# Patient Record
Sex: MALE | Race: WHITE | NOT HISPANIC OR LATINO | Employment: OTHER | URBAN - METROPOLITAN AREA
[De-identification: names, ages, dates, MRNs, and addresses within clinical notes are randomized per-mention and may not be internally consistent; named-entity substitution may affect disease eponyms.]

---

## 2017-03-21 ENCOUNTER — RECORDS - HEALTHEAST (OUTPATIENT)
Dept: LAB | Facility: CLINIC | Age: 72
End: 2017-03-21

## 2017-03-21 LAB
CHOLEST SERPL-MCNC: 208 MG/DL
FASTING STATUS PATIENT QL REPORTED: YES
HDLC SERPL-MCNC: 51 MG/DL
LDLC SERPL CALC-MCNC: 133 MG/DL
TRIGL SERPL-MCNC: 120 MG/DL

## 2017-03-23 ENCOUNTER — COMMUNICATION - HEALTHEAST (OUTPATIENT)
Dept: CARDIOLOGY | Facility: CLINIC | Age: 72
End: 2017-03-23

## 2017-03-27 ENCOUNTER — AMBULATORY - HEALTHEAST (OUTPATIENT)
Dept: CARDIOLOGY | Facility: CLINIC | Age: 72
End: 2017-03-27

## 2017-03-28 ENCOUNTER — AMBULATORY - HEALTHEAST (OUTPATIENT)
Dept: CARDIOLOGY | Facility: CLINIC | Age: 72
End: 2017-03-28

## 2017-03-29 ENCOUNTER — OFFICE VISIT - HEALTHEAST (OUTPATIENT)
Dept: CARDIOLOGY | Facility: CLINIC | Age: 72
End: 2017-03-29

## 2017-03-29 ENCOUNTER — COMMUNICATION - HEALTHEAST (OUTPATIENT)
Dept: TELEHEALTH | Facility: CLINIC | Age: 72
End: 2017-03-29

## 2017-03-29 ENCOUNTER — AMBULATORY - HEALTHEAST (OUTPATIENT)
Dept: CARDIOLOGY | Facility: CLINIC | Age: 72
End: 2017-03-29

## 2017-03-29 DIAGNOSIS — I25.10 NONOCCLUSIVE CORONARY ATHEROSCLEROSIS OF NATIVE CORONARY ARTERY: ICD-10-CM

## 2017-03-29 DIAGNOSIS — E78.5 DYSLIPIDEMIA, GOAL LDL BELOW 70: ICD-10-CM

## 2017-03-29 DIAGNOSIS — R07.9 CHEST PAIN, UNSPECIFIED TYPE: ICD-10-CM

## 2017-03-29 DIAGNOSIS — I48.0 PAROXYSMAL ATRIAL FIBRILLATION (H): ICD-10-CM

## 2017-03-29 RX ORDER — DIGOXIN 250 MCG
250 TABLET ORAL DAILY
Status: SHIPPED | COMMUNITY
Start: 2017-03-29 | End: 2024-04-24

## 2017-03-29 ASSESSMENT — MIFFLIN-ST. JEOR: SCORE: 1545.46

## 2017-04-10 ENCOUNTER — HOSPITAL ENCOUNTER (OUTPATIENT)
Dept: CARDIOLOGY | Facility: HOSPITAL | Age: 72
Discharge: HOME | End: 2017-04-10
Attending: INTERNAL MEDICINE

## 2017-04-10 LAB
CV STRESS MAX HR HE: 134
STRESS ECHO BASELINE BP: NORMAL M/S
STRESS ECHO BASELINE HR: 57 BPM
STRESS ECHO CALCULATED PERCENT HR: 91 %
STRESS ECHO LAST STRESS BP: NORMAL M/S
STRESS ECHO POST ESTIMATED WORKLOAD: 12.1 METS
STRESS ECHO POST EXERCISE DUR MIN: 10 MIN
STRESS ECHO POST EXERCISE DUR SEC: 23 SEC
STRESS ECHO TARGET HR: 125

## 2017-05-02 ENCOUNTER — COMMUNICATION - HEALTHEAST (OUTPATIENT)
Dept: TELEHEALTH | Facility: CLINIC | Age: 72
End: 2017-05-02

## 2017-05-02 ENCOUNTER — AMBULATORY - HEALTHEAST (OUTPATIENT)
Dept: CARDIOLOGY | Facility: CLINIC | Age: 72
End: 2017-05-02

## 2017-05-02 ENCOUNTER — OFFICE VISIT - HEALTHEAST (OUTPATIENT)
Dept: CARDIOLOGY | Facility: CLINIC | Age: 72
End: 2017-05-02

## 2017-05-02 DIAGNOSIS — I25.10 NONOCCLUSIVE CORONARY ATHEROSCLEROSIS OF NATIVE CORONARY ARTERY: ICD-10-CM

## 2017-05-02 DIAGNOSIS — I48.0 PAROXYSMAL ATRIAL FIBRILLATION (H): ICD-10-CM

## 2017-05-02 DIAGNOSIS — E78.5 DYSLIPIDEMIA, GOAL LDL BELOW 70: ICD-10-CM

## 2017-05-02 RX ORDER — ATORVASTATIN CALCIUM 10 MG/1
TABLET, FILM COATED ORAL
Status: SHIPPED | COMMUNITY
Start: 2017-04-21 | End: 2021-09-21

## 2017-05-02 ASSESSMENT — MIFFLIN-ST. JEOR: SCORE: 1572.67

## 2017-05-04 ENCOUNTER — COMMUNICATION - HEALTHEAST (OUTPATIENT)
Dept: CARDIOLOGY | Facility: CLINIC | Age: 72
End: 2017-05-04

## 2017-05-04 DIAGNOSIS — E78.5 HYPERLIPIDEMIA: ICD-10-CM

## 2017-05-30 ENCOUNTER — AMBULATORY - HEALTHEAST (OUTPATIENT)
Dept: CARDIOLOGY | Facility: CLINIC | Age: 72
End: 2017-05-30

## 2017-11-30 ENCOUNTER — RECORDS - HEALTHEAST (OUTPATIENT)
Dept: LAB | Facility: CLINIC | Age: 72
End: 2017-11-30

## 2017-11-30 LAB
CHOLEST SERPL-MCNC: 146 MG/DL
FASTING STATUS PATIENT QL REPORTED: NORMAL
HDLC SERPL-MCNC: 52 MG/DL
LDLC SERPL CALC-MCNC: 76 MG/DL
TRIGL SERPL-MCNC: 90 MG/DL

## 2018-11-15 ENCOUNTER — RECORDS - HEALTHEAST (OUTPATIENT)
Dept: ADMINISTRATIVE | Facility: OTHER | Age: 73
End: 2018-11-15

## 2018-11-15 ENCOUNTER — AMBULATORY - HEALTHEAST (OUTPATIENT)
Dept: CARDIOLOGY | Facility: CLINIC | Age: 73
End: 2018-11-15

## 2019-06-11 ENCOUNTER — RECORDS - HEALTHEAST (OUTPATIENT)
Dept: LAB | Facility: CLINIC | Age: 74
End: 2019-06-11

## 2019-06-11 LAB
CHOLEST SERPL-MCNC: 141 MG/DL
DIGOXIN LEVEL LHE- HISTORICAL: 0.7 NG/ML (ref 0.5–2)
FASTING STATUS PATIENT QL REPORTED: NORMAL
FASTING STATUS PATIENT QL REPORTED: NORMAL
GLUCOSE BLD-MCNC: 89 MG/DL (ref 70–125)
HDLC SERPL-MCNC: 50 MG/DL
LDLC SERPL CALC-MCNC: 79 MG/DL
PSA SERPL-MCNC: 3.9 NG/ML (ref 0–6.5)
TRIGL SERPL-MCNC: 62 MG/DL

## 2019-06-12 ENCOUNTER — AMBULATORY - HEALTHEAST (OUTPATIENT)
Dept: CARDIOLOGY | Facility: CLINIC | Age: 74
End: 2019-06-12

## 2019-06-21 ENCOUNTER — COMMUNICATION - HEALTHEAST (OUTPATIENT)
Dept: CARDIOLOGY | Facility: CLINIC | Age: 74
End: 2019-06-21

## 2019-12-17 ENCOUNTER — RECORDS - HEALTHEAST (OUTPATIENT)
Dept: LAB | Facility: CLINIC | Age: 74
End: 2019-12-17

## 2019-12-17 LAB
CHOLEST SERPL-MCNC: 152 MG/DL
DIGOXIN LEVEL LHE- HISTORICAL: 0.5 NG/ML (ref 0.5–2)
FASTING STATUS PATIENT QL REPORTED: NORMAL
HDLC SERPL-MCNC: 56 MG/DL
LDLC SERPL CALC-MCNC: 80 MG/DL
TRIGL SERPL-MCNC: 80 MG/DL

## 2020-09-11 ENCOUNTER — RECORDS - HEALTHEAST (OUTPATIENT)
Dept: LAB | Facility: CLINIC | Age: 75
End: 2020-09-11

## 2020-09-11 LAB
CHOLEST SERPL-MCNC: 144 MG/DL
DIGOXIN LEVEL LHE- HISTORICAL: 1.6 NG/ML (ref 0.5–2)
FASTING STATUS PATIENT QL REPORTED: NORMAL
HDLC SERPL-MCNC: 58 MG/DL
LDLC SERPL CALC-MCNC: 74 MG/DL
PSA SERPL-MCNC: 3 NG/ML (ref 0–6.5)
TRIGL SERPL-MCNC: 59 MG/DL

## 2021-05-25 ENCOUNTER — RECORDS - HEALTHEAST (OUTPATIENT)
Dept: ADMINISTRATIVE | Facility: CLINIC | Age: 76
End: 2021-05-25

## 2021-05-27 ENCOUNTER — RECORDS - HEALTHEAST (OUTPATIENT)
Dept: ADMINISTRATIVE | Facility: CLINIC | Age: 76
End: 2021-05-27

## 2021-05-29 NOTE — TELEPHONE ENCOUNTER
----- Message from Rashid Castaneda MD sent at 6/21/2019  8:57 AM CDT -----  Lb work sent to me by primary belen seen in 2 years does he wish to follow up? cleopatra ramos  ----- Message -----  From: Taylor Long  Sent: 6/12/2019   8:44 AM  To: Rashid Castaneda MD

## 2021-05-29 NOTE — TELEPHONE ENCOUNTER
"Pt stated labs didn't need to be sent to [facility].  He is now following with Lizette Georges.  Pt did mention that Dr Castaneda is his cardiologist and I offered to get him to scheduling for an appt since he hasn't seen Dr Castaneda in over 2 years, and pt stated \"I don't need to, I haven't been having any problems.\"  -rah  "

## 2021-05-30 VITALS — HEIGHT: 73 IN | WEIGHT: 169 LBS | BODY MASS INDEX: 22.4 KG/M2

## 2021-05-30 VITALS — HEIGHT: 73 IN | WEIGHT: 175 LBS | BODY MASS INDEX: 23.19 KG/M2

## 2021-06-10 NOTE — PROGRESS NOTES
[facility] Progress Note    Assessment:  1.  Nonobstructive coronary artery disease based on coronary CT angiography performed at [facility] after presenting for evaluation of chest discomfort.  He has not had ongoing complaints of chest discomfort.  He recently had an exercise stress echocardiogram April 10, 2017 that demonstrated no echocardiographic evidence for ischemia but did have EKG changes which are reviewed today.  We talked about the pathophysiology of coronary atherosclerosis.  We talked about utilizing aspirin 81 mg daily and LDL goal to be 70.  He is on 10 mg of atorvastatin that was started in March 2017 I have recommended a follow-up lipid and liver panel which she will do up at near his home and then have these results forwarded to me.    2.  Paroxysmal atrial fibrillation.  He has not had any awareness of atrial fibrillation in the past 1 year or greater.  He has a chads vas score of 1 and is chosen to be on aspirin and monitoring his heart rate regularly.  He is available to him propafenone to take as needed at the onset of atrial fibrillation and will continue to monitor his heart rhythm for atrial fibrillation as we discussed today.  He has been on daily digoxin which predates my consultation and he had a digoxin level May 2016 of 0.7 normal renal function March 21, 2017 and normal liver function tests.        Plan: Lipid panel in follow-up in 6 months    1. Dyslipidemia, goal LDL below 70     2. Nonocclusive coronary atherosclerosis of native coronary artery     3. Paroxysmal atrial fibrillation           An After Visit Summary was printed and given to the patient.    Subjective:    Ramesh Pate is a 72 y.o. male who returned for a planned  follow up visit.  He was traveling back from [place] and on the plane home he developed chest discomfort diaphoresis and nausea as outlined in Dr. Burns's recent notes.  He was evaluated in the [facility] and had  a CT angiogram that demonstrated mild nonobstructive coronary artery disease in the coronary territories as described and outlined in the chart record.  He has not had any complaints of exertional chest discomfort has been more regular with respect to exercise.  He described a mild parasternal focal chest discomfort when he saw Dr. Burns but is not reporting that this chest discomfort is present currently or more recently.  He has not been aware of any atrial fibrillation and monitors his pulse closely and has not utilized any propafenone or metoprolol.    Review of Systems:   General: WNL  Eyes: WNL  Ears/Nose/Throat: WNL  Lungs: WNL  Heart: Chest Pain  Stomach: WNL  Bladder: Frequent Urination at Night  Muscle/Joints: WNL  Skin: WNL  Nervous System: WNL  Mental Health: WNL     Blood: WNL      Problem List:    Patient Active Problem List   Diagnosis     Dyslipidemia, goal LDL below 70     Esophageal reflux     Paroxysmal Atrial Fibrillation     Nonocclusive coronary atherosclerosis of native coronary artery       Social History     Social History     Marital status:      Spouse name: Marilynn     Number of children: 3     Years of education: N/A     Occupational History      Retired     [employer], they made high precision machining     Social History Main Topics     Smoking status: Never Smoker     Smokeless tobacco: Never Used     Alcohol use No     Drug use: No     Sexual activity: Yes     Partners: Female     Birth control/ protection: Post-menopausal     Other Topics Concern     Not on file     Social History Narrative    Bought a condo on the [address] [facility] and was remodeling it during January and February to rent it. He enjoys walking, fishing, house projects and volunteers at the [employer] and is the  for his [employer].       Family History   Problem Relation Age of Onset     Liver cancer Mother      Lung cancer Father      No Medical Problems Sister      Parkinsonism  "Sister      No Medical Problems Daughter      No Medical Problems Daughter      No Medical Problems Daughter        Current Outpatient Prescriptions   Medication Sig Dispense Refill     aspirin 81 MG EC tablet Take 81 mg by mouth daily.       atorvastatin (LIPITOR) 10 MG tablet        digoxin (LANOXIN) 250 mcg tablet Take 250 mcg by mouth daily.       MULTIVITAMIN (MULTIPLE VITAMINS ORAL) Take 1 tablet by mouth daily.       omeprazole (PRILOSEC) 40 MG capsule Take 40 mg by mouth daily.       pramipexole (MIRAPEX) 0.75 MG tablet Take 1/2 tablet daily       propafenone (RYTHMOL) 150 MG tablet Take 1 tablet (150 mg total) by mouth every 6 (six) hours as needed (TAKE 1 TAB AT THE ONSET OF AFIB, MAY REPEAT IN 6 HRS IF AFIB PERSISTS). 6 tablet 3     vardenafil (LEVITRA) 20 MG tablet Take 20 mg by mouth as needed.       VIT C/VIT E/LUTEIN/MIN/OMEGA-3 (OCUVITE ORAL) Take 1 tablet by mouth daily.       No current facility-administered medications for this visit.        Objective:     /68 (Patient Site: Right Arm, Patient Position: Sitting, Cuff Size: Adult Regular)  Pulse 72  Resp 16  Ht 6' 1\" (1.854 m)  Wt 175 lb (79.4 kg)  BMI 23.09 kg/m2  175 lb (79.4 kg)       Physical Exam:    GENERAL APPEARANCE: alert, no apparent distress  HEENT: no scleral icterus or xanthelasma  NECK: jugular venous pressure within normal limits  CHEST: symmetric, the lungs are clear to auscultation  CARDIOVASCULAR: regular rhythm without murmur, click, or gallop; no carotid bruits  Abdomen: No Organomegaly, masses, bruits, or tenderness. Bowels sounds are present      EXTREMITIES: no cyanosis, clubbing or edema    Cardiac Testing:  Indications      Chest pain, unspecified type       Interpretation Summary        Stress echocardiogram is negative for inducible ischemia.    The stress electrocardiogram was abnormal with 3 mm of down slopping ST segment depression in the II, III, aVF, V3, V4, V5 and V6 leads, beginning at 5.41 minutes of " stress, and returning to baseline after 7 minutes into the recovery phase.    The patient's exercise tolerance was normal.    No symptoms were reported by the patient during exercise.       CORONARY ANGIOGRAPHY:     LM: Left main bifurcates into the LAD and LCX.  There is focal calcified and mild noncalcified plaque at the ostium of the left main with less than 20% luminal narrowing.    LAD: The LAD is a moderate sized vessel;  it supplies three diagonal vessel/vessels before going on to the apex.  There is a mild burden of calcified plaque within the proximal and mid LAD with the most prominent lesion located in the proximal LAD resulting in less than 25% narrowing .    LCX:  The LCX is a a moderate sized vessel;  it supplies 3 obtuse marginal/marginals.  There is moderate focal calcified plaque within the mid circumflex but without evidence of a significant stenosis.    RCA: The RCA is a moderate gauge vessel which supplies the PDA and PL branches.  There is a mild burden of calcified and noncalcified plaque within the right coronary artery but with less than 30% luminal narrowing.     LV FUNCTION:   LVEF:  55%; wall motion demonstrates no focal wall motion abnormalities.     OTHER CARDIAC FINDINGS:   Diffuse atherosclerotic plaque along the included aorta.  Normal caliber great vessels.    NON CARDIAC CT FINDINGS (reviewed with Radiology):   There is no mediastinal or hilar adenopathy.  Bibasilar dependent areas of atelectasis.  And additional dynamic opacities may reflect atelectasis and/or scarring.  No pleural effusion.  Central airways are normal.  Included upper abdomen is unremarkable.  Mild thoracic spondylosis        Lab Results:    Lab Results   Component Value Date     03/21/2017    K 4.1 03/21/2017     03/21/2017    CO2 29 03/21/2017    BUN 19 03/21/2017    CREATININE 0.98 03/21/2017    CALCIUM 9.5 03/21/2017     Lab Results   Component Value Date    CHOL 208 (H) 03/21/2017    TRIG 120  03/21/2017    HDL 51 03/21/2017     No results found for: BNP  Creatinine (mg/dL)   Date Value   03/21/2017 0.98   05/25/2016 0.97   03/22/2016 0.94   10/13/2015 0.95     LDL Calculated (mg/dL)   Date Value   03/21/2017 133 (H)   05/25/2016 121   10/13/2015 114     Lab Results   Component Value Date    WBC 5.0 03/22/2016    HGB 13.4 (L) 03/22/2016    HCT 40.8 03/22/2016    MCV 92 03/22/2016     03/22/2016               This note has been dictated using voice recognition software. Any grammatical or context distortions are unintentional and inherent to the software.      Rashid Castaneda M.D., F.A.C.C.  [facility]  [phone]

## 2021-06-15 PROBLEM — I25.10 NONOCCLUSIVE CORONARY ATHEROSCLEROSIS OF NATIVE CORONARY ARTERY: Chronic | Status: ACTIVE | Noted: 2017-03-17

## 2021-06-25 ENCOUNTER — RECORDS - HEALTHEAST (OUTPATIENT)
Dept: LAB | Facility: CLINIC | Age: 76
End: 2021-06-25

## 2021-06-25 LAB
C REACTIVE PROTEIN LHE: 0.2 MG/DL (ref 0–0.8)
ERYTHROCYTE [SEDIMENTATION RATE] IN BLOOD BY WESTERGREN METHOD: 9 MM/HR (ref 0–15)

## 2021-06-25 NOTE — PROGRESS NOTES
"Progress Notes by Ramin Burns MD at 3/29/2017 11:20 AM     Author: Ramin Burns MD Service: -- Author Type: Physician    Filed: 3/29/2017 12:07 PM Encounter Date: 3/29/2017 Status: Signed    : Ramin Burns MD (Physician)           Click to link to [facility] Note    Ramesh Pate was advised by Dr. Rashid Castaneda to meet with me today at the [facility] to evaluate chest discomfort.     Assessment:    1. Chest pain, unspecified type  Echo Stress Exercise   2. Nonocclusive coronary atherosclerosis of native coronary artery     3. Paroxysmal atrial fibrillation     4. Dyslipidemia, goal LDL below 70         Plan:    Pradeep is concerned about his heart despite the CT coronary angiographic findings in [place] which showed nonocclusive coronary atherosclerosis.  He was under the impression that his arteries were \"blocked\".  We reviewed how these findings generally portend a favorable prognosis.  I told him I agreed with the advice of the cardiologist in [place] that he should be on a statin.  To further allay his concerns, we will schedule an exercise echocardiographic stress test.  If the stress test results are favorable, as expected, I have asked him to discuss his esophageal reflux and other potential causes of chest discomfort with Dr. Milton Watkins.  I also recommended that he keep his follow-up appointment with Dr. Rashid Castaneda in April.    An After Visit Summary was printed and given to the patient.    Current History:    Pradeep came to see me today because of the above concerns.  He states that he and his wife [place] a con[place]ium [place] the [place] and spent 9 months there in January and February remodeling it so it could be rented.  He found this to be very stressful psychologically.  On the plane flight home he suffered from chest discomfort, diaphoresis and nausea.  He was evaluated by the flight " "attendant and a physician who happened to be on board.  They landed in [place].  He states that [employer] would not let him board the plane to [place] without further evaluation.  He went to the [facility] and spent the entire day there.  He had a CT coronary angiogram which found evidence of nonocclusive coronary atherosclerosis with up to 25% coronary plaquing.  He was under the impression that his coronary arteries were \"blocked\".  He continues to be concerned about his heart.  He reports getting a mild parasternal, focal chest discomfort both with and without walking.  It does not stop him from walking.  He does not experience shortness of breath, palpitations, lightheadedness, loss of consciousness, orthopnea, or lower extremity edema.    He states he has not been troubled by atrial fibrillation for the last 2 years and therefore has not had to use the as needed propafenone or metoprolol.    Patient Active Problem List   Diagnosis   ? Dyslipidemia, goal LDL below 70   ? Esophageal reflux   ? Paroxysmal Atrial Fibrillation   ? Nonocclusive coronary atherosclerosis of native coronary artery       Past Medical History:  Past Medical History:   Diagnosis Date   ? Dyslipidemia, goal LDL below 70    ? Esophageal reflux    ? Nonocclusive coronary atherosclerosis of native coronary artery 3/17/2017   ? Paroxysmal Atrial Fibrillation        Past Surgical History:  Past Surgical History:   Procedure Laterality Date   ? APPENDECTOMY  1966   ? MD REVISE SECONDARY VARICOSITY      Description: Varicose Vein Ligation;  Recorded: 03/07/2013;       Family History:  Family History   Problem Relation Age of Onset   ? Liver cancer Mother    ? Lung cancer Father    ? No Medical Problems Sister    ? Parkinsonism Sister    ? No Medical Problems Daughter    ? No Medical Problems Daughter    ? No Medical Problems Daughter        Social History:   reports that he has never smoked. He has never used " "smokeless tobacco. He reports that he does not drink alcohol or use illicit drugs.    Medications:  Outpatient Encounter Prescriptions as of 3/29/2017   Medication Sig Dispense Refill   ? aspirin 81 MG EC tablet Take 81 mg by mouth daily.     ? atorvastatin (LIPITOR) 10 MG tablet Take 10 mg by mouth at bedtime.     ? digoxin (LANOXIN) 250 mcg tablet Take 250 mcg by mouth daily.     ? MULTIVITAMIN (MULTIPLE VITAMINS ORAL) Take 1 tablet by mouth daily.     ? omeprazole (PRILOSEC) 40 MG capsule Take 40 mg by mouth daily.     ? pramipexole (MIRAPEX) 0.75 MG tablet Take 1/2 tablet daily     ? propafenone (RYTHMOL) 150 MG tablet Take 1 tablet (150 mg total) by mouth every 6 (six) hours as needed (TAKE 1 TAB AT THE ONSET OF AFIB, MAY REPEAT IN 6 HRS IF AFIB PERSISTS). 6 tablet 3   ? vardenafil (LEVITRA) 20 MG tablet Take 20 mg by mouth as needed.     ? VIT C/VIT E/LUTEIN/MIN/OMEGA-3 (OCUVITE ORAL) Take 1 tablet by mouth daily.     ? dutasteride (AVODART) 0.5 mg capsule Take 0.5 mg by mouth daily.     ? metoprolol (LOPRESSOR) 25 MG tablet TAKE 1/2 TAB AT THE ONSET OF AFIB, MAY REPEAT IN 6 HRS IF AFIB PERSISTS, CALL CLINIC IF AFIB LAST MORE THAN 12 HOURS 60 tablet 11   ? sildenafil (VIAGRA) 100 MG tablet Take as directed       No facility-administered encounter medications on file as of 3/29/2017.        Allergies  Review of patient's allergies indicates no known allergies.    Review of Systems    General: WNL  Eyes: WNL  Ears/Nose/Throat: WNL  Lungs: WNL  Heart: Chest Pain  Stomach: WNL  Bladder: WNL  Muscle/Joints: WNL  Skin: WNL  Nervous System: WNL  Mental Health: WNL     Blood: WNL    Objective:    Wt Readings from Last 5 Encounters:   03/29/17 169 lb (76.7 kg)   03/17/15 177 lb (80.3 kg)      6' 1\" (1.854 m)  Body mass index is 22.3 kg/(m^2).  /66 (Patient Site: Right Arm, Patient Position: Sitting, Cuff Size: Adult Regular)  Pulse 72  Resp 16  Ht 6' 1\" (1.854 m)  Wt 169 lb (76.7 kg)  BMI 22.3 kg/m2   "   Physical Exam:    General Appearance: Alert and not in distress   HEENT: No scleral icterus; the mucous membranes are pink and moist   Neck: No cervical bruits, adenopathy, or thyromegaly; jugular venous pressure is less than 5 cm    Chest: The spine is straight and the chest is symmetric   Lungs: Respirations are unlabored; the lungs are clear to auscultation   Cardiovasular: Auscultation reveals normal first and second heart sounds with no murmurs, rubs, or gallops   Extremities: No cyanosis, clubbing or edema   Skin: No xanthelasma   Neurologic: Normal gait and coordination   Psychiatric: Mood and affect; anxious, concerned, but cooperative        Cardiac testing:    The report of the CT angiogram from the [facility] was reviewed in detail as noted above.    Imaging:    No results found.    Lab Review:    Lab Results   Component Value Date     03/21/2017    K 4.1 03/21/2017     03/21/2017    CO2 29 03/21/2017    BUN 19 03/21/2017    CREATININE 0.98 03/21/2017    CALCIUM 9.5 03/21/2017     Lab Results   Component Value Date    WBC 5.0 03/22/2016    HGB 13.4 (L) 03/22/2016    HCT 40.8 03/22/2016    MCV 92 03/22/2016     03/22/2016     Lab Results   Component Value Date    CHOL 208 (H) 03/21/2017    TRIG 120 03/21/2017    HDL 51 03/21/2017     LDL Calculated (mg/dL)   Date Value   03/21/2017 133 (H)   05/25/2016 121   10/13/2015 114     No results found for: BNP        Much or all of the text in this note was generated through the use of the Dragon Dictate voice-to-text software. Errors in spelling or words which seem out of context are unintentional. Sound alike errors, in particular, may have escaped editing.

## 2021-06-29 LAB — B BURGDOR IGG+IGM SER QL: 0.04 INDEX VALUE

## 2021-09-21 ENCOUNTER — OFFICE VISIT (OUTPATIENT)
Dept: CARDIOLOGY | Facility: CLINIC | Age: 76
End: 2021-09-21
Payer: MEDICARE

## 2021-09-21 VITALS
SYSTOLIC BLOOD PRESSURE: 134 MMHG | WEIGHT: 174 LBS | HEART RATE: 71 BPM | RESPIRATION RATE: 16 BRPM | BODY MASS INDEX: 22.96 KG/M2 | DIASTOLIC BLOOD PRESSURE: 78 MMHG | OXYGEN SATURATION: 98 %

## 2021-09-21 DIAGNOSIS — E78.5 DYSLIPIDEMIA, GOAL LDL BELOW 70: ICD-10-CM

## 2021-09-21 DIAGNOSIS — I48.0 PAROXYSMAL ATRIAL FIBRILLATION (H): ICD-10-CM

## 2021-09-21 DIAGNOSIS — C61 PROSTATE CANCER (H): ICD-10-CM

## 2021-09-21 DIAGNOSIS — I25.10 CORONARY ARTERY DISEASE INVOLVING NATIVE CORONARY ARTERY OF NATIVE HEART WITHOUT ANGINA PECTORIS: Primary | ICD-10-CM

## 2021-09-21 PROCEDURE — 99205 OFFICE O/P NEW HI 60 MIN: CPT | Performed by: INTERNAL MEDICINE

## 2021-09-21 RX ORDER — CLOPIDOGREL BISULFATE 75 MG/1
75 TABLET ORAL
COMMUNITY
Start: 2021-08-25 | End: 2021-12-20

## 2021-09-21 RX ORDER — ATORVASTATIN CALCIUM 40 MG/1
40 TABLET, FILM COATED ORAL
COMMUNITY
Start: 2021-08-24 | End: 2021-12-20

## 2021-09-21 RX ORDER — ESOMEPRAZOLE MAGNESIUM 40 MG/1
40 CAPSULE, DELAYED RELEASE ORAL DAILY
COMMUNITY

## 2021-09-21 RX ORDER — METOPROLOL SUCCINATE 25 MG/1
6.25 TABLET, EXTENDED RELEASE ORAL
COMMUNITY
Start: 2021-08-25 | End: 2021-12-20

## 2021-09-21 NOTE — PROGRESS NOTES
Click to link to [facility] HEART CARE NOTE    Thank you, Dr. Robertson, for asking me to see Ramesh Pate in consultation at [facility] to establish cardiology care.      Assessment/Plan:   1.  Coronary artery disease s/p YESY to mid RCA, non-STEMI on August 21, 2021: The patient has been doing cardiac rehab since hospital discharge from [facility].  He has no previous burning chest pain.  He has no shortness of breath.  He did not complains of palpitations.  He does have fatigue.  We discussed his coronary angiogram with PCI.  Discussed the management of coronary artery disease.  Continue Plavix for 12 months.  Continue atorvastatin 40 mg at bedtime.  He is on small amount of metoprolol XL 6.25 mg daily.  His aspirin is discontinued today because he is going to start Eliquis 5 mg twice daily for paroxysmal atrial fibrillation.  His medical records of from a [facility] are reviewed including coronary angiogram and echocardiogram.  His echocardiogram was reported normal left ventricular systolic function LVEF was 60%, mild hypokinesis in basal inferior segment.  No obvious valvular disease.    2.  Paroxysmal atrial fibrillation: He states that he has above to 2 episodes of atrial fibrillation every year, lasted for several hours.  He has no palpitations recently.  Continue digoxin 0.25 mg daily, metoprolol XL 6.25 mg daily.  His [id number] score is 3 due to age and CAD.  He has no risk of for bleeding.  Recommended chronic anticoagulation for prevention of stroke.  After long discussion, the patient agreed to start anticoagulation to avoid stroke.  Start Eliquis 5 mg twice a day.  He will monitor the side effects of bleeding.    3.  Dyslipidemia: Continue atorvastatin 40 mg at bedtime.  Repeat the lipid profile and liver function in 2 months at Dr. Robertson's office.  His atorvastatin was increased from 10 mg post acute MI 3 weeks  ago.    4.  Prostate cancer: The patient had TURP surgery at [facility].  He was diagnosed with prostate cancer.  The patient states that he needs another biopsy.  Since the patient had acute non-ST elevation MI and drug-eluting stent placement on August 21, 2021, the patient is a high risk to stop Plavix at this time.  We discussed that the benefit and risk.  After discussion, it is better to wait for 3 months post YESY placement.  The patient is going to talk to his urologist.      Thank you for the opportunity to be involved in the care of Ramesh Pate. If you have any questions, please feel free to contact me.  I will see the patient again in 6 months and as needed    Much or all of the text in this note was generated through the use of Dragon Dictate voice-to-text software. Errors in spelling or words which seem out of context are unintentional.   Sound alike errors, in particular, may have escaped editing.       History of Present Illness:   It is my pleasure to see Ramesh Pate at the [facility] for establishing cardiology care. Ramesh Pate is a 76 year old male with a medical history of coronary artery disease status post YESY to mid RCA, acute non-STEMI on August 21, 2021, dyslipidemia, paroxysmal atrial fibrillation and prostate cancer.    The patient saw Dr. Castaneda 4 years ago.  He developed acute non-ST elevation MI on August 24 and had YESY placement to mid RCA at that time.  The patient presents to cardiology clinic today for reestablishing cardiology care.    He did not have burning chest pain.  He has no shortness of breath, palpitations, dizziness, orthopnea, PND or leg edema.  He complains of for fatigue.  He has no muscle aching.  His blood pressure and heart rate are controlled.  The patient has been doing cardiac rehab and doing well.    His medical records from [facility] are reviewed.    Past Medical History:     Patient Active Problem List    Diagnosis     Dyslipidemia, goal LDL below 70     Esophageal reflux     Paroxysmal Atrial Fibrillation     Nonocclusive coronary atherosclerosis of native coronary artery       Past Surgical History:     Past Surgical History:   Procedure Laterality Date     APPENDECTOMY  1966     REVISE SECONDARY VARICOSITY      Description: Varicose Vein Ligation;  Recorded: 03/07/2013;       Family History:     Family History   Problem Relation Age of Onset     Liver Cancer Mother      Lung Cancer Father      No Known Problems Sister      Parkinsonism Sister      No Known Problems Daughter      No Known Problems Daughter      No Known Problems Daughter        Social History:    reports that he has never smoked. He has never used smokeless tobacco. He reports that he does not drink alcohol and does not use drugs.    Review of Systems:   12 systems are reviewed negative except for in HPI.    Meds:     Current Outpatient Medications:      apixaban ANTICOAGULANT (ELIQUIS ANTICOAGULANT) 5 MG tablet, Take 1 tablet (5 mg) by mouth 2 times daily, Disp: 60 tablet, Rfl: 11     atorvastatin (LIPITOR) 40 MG tablet, Take 40 mg by mouth, Disp: , Rfl:      clopidogrel (PLAVIX) 75 MG tablet, Take 75 mg by mouth, Disp: , Rfl:      digoxin (LANOXIN) 250 mcg tablet, [DIGOXIN (LANOXIN) 250 MCG TABLET] Take 250 mcg by mouth daily., Disp: , Rfl:      esomeprazole (NEXIUM) 40 MG DR capsule, Take 40 mg by mouth, Disp: , Rfl:      metoprolol succinate ER (TOPROL-XL) 25 MG 24 hr tablet, Take 6.25 mg by mouth, Disp: , Rfl:      MULTIVITAMIN (MULTIPLE VITAMINS ORAL), [MULTIVITAMIN (MULTIPLE VITAMINS ORAL)] Take 1 tablet by mouth daily., Disp: , Rfl:      pramipexole (MIRAPEX) 0.75 MG tablet, [PRAMIPEXOLE (MIRAPEX) 0.75 MG TABLET] Take 1/2 tablet daily, Disp: , Rfl:      VIT C/VIT E/LUTEIN/MIN/OMEGA-3 (OCUVITE ORAL), [VIT C/VIT E/LUTEIN/MIN/OMEGA-3 (OCUVITE ORAL)] Take 1 tablet by mouth daily., Disp: , Rfl:      Allergies:   Patient has no known  allergies.    Objective:      Physical Exam  78.9 kg (174 lb)     Body mass index is 22.96 kg/m .  /78 (BP Location: Left arm, Patient Position: Sitting, Cuff Size: Adult Regular)   Pulse 71   Resp 16   Wt 78.9 kg (174 lb)   SpO2 98%   BMI 22.96 kg/m      General Appearance:   Awake, Alert, No acute distress.   HEENT:  Pupil equal, reactive to light. No scleral icterus; the mucous membranes were moist. No oral ulcers or thrush.    Neck: No cervical bruits. No JVD. No thyromegaly. No lymph node enlargement or tenderness.   Chest: The spine was straight. The chest was symmetric.   Lungs:   Respirations unlabored. Lungs are clear to auscultation. No crackles. No wheezing.   Cardiovascular:   RRR, normal first and second heart sounds with no murmurs. No rubs or gallops.    Abdomen:  Soft. No tenderness. Non-distended. Bowels sounds are present   Extremities: Equal posterior tibial pulses. No leg edema.   Skin: No rashes or ulcers. Warm, Dry.   Musculoskeletal: No tenderness. No deformity.   Neurologic: Mood and affect are appropriate. No focal deficits.         EKG:  Personally reivewed  Sinus rhythm with 1st degree A-V block   T wave abnormality, consider inferior ischemia   Abnormal ECG   When compared with ECG of 23-AUG-2021 17:01,   No significant change was found     Cardiac Imaging Studies  ECHO on 8-:   1. Echo  8/24/2021 9:31:00 AM.     2. The left ventricular chamber dimension is  normal and systolic   function   is normal  with an estimated ejection fraction of 60%.     3. The basal inferior wall is mildly hypokinetic.     4. There is mildly increased left ventricular wall thickness.     5. The right ventricular chamber dimension is borderline mildly enlarged   and   systolic function is normal.     6. There is trace aortic valve regurgitation.     7. There is trace mitral valve regurgitation.     8. There is trace tricuspid valve regurgitation.     9. There is trace pulmonic regurgitation.      10. The aortic root is mildly dilated measuring 4.10 cm with an index of   2.03 cm/m2.     11. The proximal ascending aorta is moderately dilated measuring 4.60 cm   with an index of 2.28 cm/m2.  (CTA documented no aortic aneurysm)    12. There is no pericardial effusion.       Coronary angiogram with PCI on 8-:    1. NSTEMI.     2. LAD and LCx with minimal CAD, no significant stenoses.     3. Mid RCA 80% stenosis. Uneventful PCI with 4.5x20 YESY.     4. LVEDP 10mmHg.     Lab Review   No results found for: NA, CO2, BUN, CREATININE, GLUCOSE  No results found for: WBC, HGB, HCT, MCV, PLT  Lab Results   Component Value Date    CHOL 144 09/11/2020    TRIG 59 09/11/2020    HDL 58 09/11/2020    LDL 74 09/11/2020     LDL Cholesterol Calculated   Date Value Ref Range Status   09/11/2020 74 <=129 mg/dL Final     No results found for: TROPONINI  No results found for: BNP  No results found for: TSH

## 2021-09-21 NOTE — LETTER
9/21/2021    Luis Manuel Robertson MD  [facility] [address]  [facility] [address]    RE: Ramesh Pate       Dear Colleague,    I had the pleasure of seeing Ramesh Pate in the [facility].        Click to link to [facility] HEART CARE NOTE    Thank you, Dr. Robertson, for asking me to see Ramesh Pate in consultation at [facility] to establish cardiology care.      Assessment/Plan:   1.  Coronary artery disease s/p YESY to mid RCA, non-STEMI on August 21, 2021: The patient has been doing cardiac rehab since hospital discharge from [facility].  He has no previous burning chest pain.  He has no shortness of breath.  He did not complains of palpitations.  He does have fatigue.  We discussed his coronary angiogram with PCI.  Discussed the management of coronary artery disease.  Continue Plavix for 12 months.  Continue atorvastatin 40 mg at bedtime.  He is on small amount of metoprolol XL 6.25 mg daily.  His aspirin is discontinued today because he is going to start Eliquis 5 mg twice daily for paroxysmal atrial fibrillation.  His medical records of from a [facility] are reviewed including coronary angiogram and echocardiogram.  His echocardiogram was reported normal left ventricular systolic function LVEF was 60%, mild hypokinesis in basal inferior segment.  No obvious valvular disease.    2.  Paroxysmal atrial fibrillation: He states that he has above to 2 episodes of atrial fibrillation every year, lasted for several hours.  He has no palpitations recently.  Continue digoxin 0.25 mg daily, metoprolol XL 6.25 mg daily.  His [id number] score is 3 due to age and CAD.  He has no risk of for bleeding.  Recommended chronic anticoagulation for prevention of stroke.  After long discussion, the patient agreed to start anticoagulation to avoid stroke.  Start Eliquis 5 mg twice a  day.  He will monitor the side effects of bleeding.    3.  Dyslipidemia: Continue atorvastatin 40 mg at bedtime.  Repeat the lipid profile and liver function in 2 months at Dr. Robertson's office.  His atorvastatin was increased from 10 mg post acute MI 3 weeks ago.    4.  Prostate cancer: The patient had TURP surgery at [facility].  He was diagnosed with prostate cancer.  The patient states that he needs another biopsy.  Since the patient had acute non-ST elevation MI and drug-eluting stent placement on August 21, 2021, the patient is a high risk to stop Plavix at this time.  We discussed that the benefit and risk.  After discussion, it is better to wait for 3 months post YESY placement.  The patient is going to talk to his urologist.      Thank you for the opportunity to be involved in the care of Ramesh Pate. If you have any questions, please feel free to contact me.  I will see the patient again in 6 months and as needed    Much or all of the text in this note was generated through the use of Dragon Dictate voice-to-text software. Errors in spelling or words which seem out of context are unintentional.   Sound alike errors, in particular, may have escaped editing.       History of Present Illness:   It is my pleasure to see Ramesh Pate at the [facility] clinic for establishing cardiology care. Ramesh Pate is a 76 year old male with a medical history of coronary artery disease status post YESY to mid RCA, acute non-STEMI on August 21, 2021, dyslipidemia, paroxysmal atrial fibrillation and prostate cancer.    The patient saw Dr. Castaneda 4 years ago.  He developed acute non-ST elevation MI on August 24 and had YESY placement to mid RCA at that time.  The patient presents to cardiology clinic today for reestablishing cardiology care.    He did not have burning chest pain.  He has no shortness of breath, palpitations, dizziness, orthopnea, PND or leg edema.  He complains of for fatigue.  He has  no muscle aching.  His blood pressure and heart rate are controlled.  The patient has been doing cardiac rehab and doing well.    His medical records from [facility] are reviewed.    Past Medical History:     Patient Active Problem List   Diagnosis     Dyslipidemia, goal LDL below 70     Esophageal reflux     Paroxysmal Atrial Fibrillation     Nonocclusive coronary atherosclerosis of native coronary artery       Past Surgical History:     Past Surgical History:   Procedure Laterality Date     APPENDECTOMY  1966     REVISE SECONDARY VARICOSITY      Description: Varicose Vein Ligation;  Recorded: 03/07/2013;       Family History:     Family History   Problem Relation Age of Onset     Liver Cancer Mother      Lung Cancer Father      No Known Problems Sister      Parkinsonism Sister      No Known Problems Daughter      No Known Problems Daughter      No Known Problems Daughter        Social History:    reports that he has never smoked. He has never used smokeless tobacco. He reports that he does not drink alcohol and does not use drugs.    Review of Systems:   12 systems are reviewed negative except for in HPI.    Meds:     Current Outpatient Medications:      apixaban ANTICOAGULANT (ELIQUIS ANTICOAGULANT) 5 MG tablet, Take 1 tablet (5 mg) by mouth 2 times daily, Disp: 60 tablet, Rfl: 11     atorvastatin (LIPITOR) 40 MG tablet, Take 40 mg by mouth, Disp: , Rfl:      clopidogrel (PLAVIX) 75 MG tablet, Take 75 mg by mouth, Disp: , Rfl:      digoxin (LANOXIN) 250 mcg tablet, [DIGOXIN (LANOXIN) 250 MCG TABLET] Take 250 mcg by mouth daily., Disp: , Rfl:      esomeprazole (NEXIUM) 40 MG DR capsule, Take 40 mg by mouth, Disp: , Rfl:      metoprolol succinate ER (TOPROL-XL) 25 MG 24 hr tablet, Take 6.25 mg by mouth, Disp: , Rfl:      MULTIVITAMIN (MULTIPLE VITAMINS ORAL), [MULTIVITAMIN (MULTIPLE VITAMINS ORAL)] Take 1 tablet by mouth daily., Disp: , Rfl:      pramipexole (MIRAPEX) 0.75 MG tablet,  [PRAMIPEXOLE (MIRAPEX) 0.75 MG TABLET] Take 1/2 tablet daily, Disp: , Rfl:      VIT C/VIT E/LUTEIN/MIN/OMEGA-3 (OCUVITE ORAL), [VIT C/VIT E/LUTEIN/MIN/OMEGA-3 (OCUVITE ORAL)] Take 1 tablet by mouth daily., Disp: , Rfl:      Allergies:   Patient has no known allergies.    Objective:      Physical Exam  78.9 kg (174 lb)     Body mass index is 22.96 kg/m .  /78 (BP Location: Left arm, Patient Position: Sitting, Cuff Size: Adult Regular)   Pulse 71   Resp 16   Wt 78.9 kg (174 lb)   SpO2 98%   BMI 22.96 kg/m      General Appearance:   Awake, Alert, No acute distress.   HEENT:  Pupil equal, reactive to light. No scleral icterus; the mucous membranes were moist. No oral ulcers or thrush.    Neck: No cervical bruits. No JVD. No thyromegaly. No lymph node enlargement or tenderness.   Chest: The spine was straight. The chest was symmetric.   Lungs:   Respirations unlabored. Lungs are clear to auscultation. No crackles. No wheezing.   Cardiovascular:   RRR, normal first and second heart sounds with no murmurs. No rubs or gallops.    Abdomen:  Soft. No tenderness. Non-distended. Bowels sounds are present   Extremities: Equal posterior tibial pulses. No leg edema.   Skin: No rashes or ulcers. Warm, Dry.   Musculoskeletal: No tenderness. No deformity.   Neurologic: Mood and affect are appropriate. No focal deficits.         EKG:  Personally reivewed  Sinus rhythm with 1st degree A-V block   T wave abnormality, consider inferior ischemia   Abnormal ECG   When compared with ECG of 23-AUG-2021 17:01,   No significant change was found     Cardiac Imaging Studies  ECHO on 8-:   1. Echo  8/24/2021 9:31:00 AM.     2. The left ventricular chamber dimension is  normal and systolic   function   is normal  with an estimated ejection fraction of 60%.     3. The basal inferior wall is mildly hypokinetic.     4. There is mildly increased left ventricular wall thickness.     5. The right ventricular chamber dimension is  borderline mildly enlarged   and   systolic function is normal.     6. There is trace aortic valve regurgitation.     7. There is trace mitral valve regurgitation.     8. There is trace tricuspid valve regurgitation.     9. There is trace pulmonic regurgitation.     10. The aortic root is mildly dilated measuring 4.10 cm with an index of   2.03 cm/m2.     11. The proximal ascending aorta is moderately dilated measuring 4.60 cm   with an index of 2.28 cm/m2.  (CTA documented no aortic aneurysm)    12. There is no pericardial effusion.       Coronary angiogram with PCI on 8-:    1. NSTEMI.     2. LAD and LCx with minimal CAD, no significant stenoses.     3. Mid RCA 80% stenosis. Uneventful PCI with 4.5x20 YESY.     4. LVEDP 10mmHg.     Lab Review   No results found for: NA, CO2, BUN, CREATININE, GLUCOSE  No results found for: WBC, HGB, HCT, MCV, PLT  Lab Results   Component Value Date    CHOL 144 09/11/2020    TRIG 59 09/11/2020    HDL 58 09/11/2020    LDL 74 09/11/2020     LDL Cholesterol Calculated   Date Value Ref Range Status   09/11/2020 74 <=129 mg/dL Final     No results found for: TROPONINI  No results found for: BNP  No results found for: TSH                Thank you for allowing me to participate in the care of your patient.      Sincerely,     August Valencia MD     [facility]  cc:   No referring provider defined for this encounter.

## 2021-10-06 ENCOUNTER — LAB REQUISITION (OUTPATIENT)
Dept: LAB | Facility: CLINIC | Age: 76
End: 2021-10-06
Payer: MEDICARE

## 2021-10-06 DIAGNOSIS — E78.5 HYPERLIPIDEMIA, UNSPECIFIED: ICD-10-CM

## 2021-10-06 LAB
AST SERPL W P-5'-P-CCNC: 30 U/L (ref 0–40)
CHOLEST SERPL-MCNC: 125 MG/DL
FASTING STATUS PATIENT QL REPORTED: NORMAL
HDLC SERPL-MCNC: 50 MG/DL
LDLC SERPL CALC-MCNC: 60 MG/DL
TRIGL SERPL-MCNC: 74 MG/DL

## 2021-10-06 PROCEDURE — 84450 TRANSFERASE (AST) (SGOT): CPT | Mod: ORL | Performed by: FAMILY MEDICINE

## 2021-10-06 PROCEDURE — 80061 LIPID PANEL: CPT | Mod: ORL | Performed by: FAMILY MEDICINE

## 2021-10-29 ENCOUNTER — TELEPHONE (OUTPATIENT)
Dept: CARDIOLOGY | Facility: CLINIC | Age: 76
End: 2021-10-29

## 2021-10-29 NOTE — TELEPHONE ENCOUNTER
----- Message from Lexii Horowitz V sent at 10/29/2021 10:07 AM CDT -----  General phone call: Pt wwants to know if he should take his afib medication, propafenone.  He just had a heart attack and was given medicaiton for that  and doesn't know if it will interact well with his afib med    Caller: dusty  Primary cardiologist: faby  Detailed reason for call: see above  New or active symptoms? yes  Best phone number: [phone]  Best time to contact: anytime  Ok to leave a detailedmessage? yes  Device? no    Additional Info:

## 2021-10-29 NOTE — TELEPHONE ENCOUNTER
Informed pt that propafenone was discontinued.  Pt was placed on metoprolol XL and will continue digoxin 250 mcg daily.  Pt verbalized understanding and will follow-up with dr Castaneda as planned.  mary

## 2021-12-20 ENCOUNTER — OFFICE VISIT (OUTPATIENT)
Dept: CARDIOLOGY | Facility: CLINIC | Age: 76
End: 2021-12-20
Payer: MEDICARE

## 2021-12-20 VITALS
DIASTOLIC BLOOD PRESSURE: 58 MMHG | SYSTOLIC BLOOD PRESSURE: 110 MMHG | WEIGHT: 172 LBS | HEART RATE: 71 BPM | RESPIRATION RATE: 14 BRPM | BODY MASS INDEX: 22.69 KG/M2

## 2021-12-20 DIAGNOSIS — I25.10 CORONARY ARTERY DISEASE DUE TO LIPID RICH PLAQUE: Primary | ICD-10-CM

## 2021-12-20 DIAGNOSIS — I48.0 PAROXYSMAL ATRIAL FIBRILLATION (H): ICD-10-CM

## 2021-12-20 DIAGNOSIS — I25.83 CORONARY ARTERY DISEASE DUE TO LIPID RICH PLAQUE: Primary | ICD-10-CM

## 2021-12-20 PROCEDURE — 93000 ELECTROCARDIOGRAM COMPLETE: CPT | Performed by: INTERNAL MEDICINE

## 2021-12-20 PROCEDURE — 99214 OFFICE O/P EST MOD 30 MIN: CPT | Performed by: INTERNAL MEDICINE

## 2021-12-20 RX ORDER — ATORVASTATIN CALCIUM 40 MG/1
40 TABLET, FILM COATED ORAL DAILY
Qty: 90 TABLET | Refills: 4 | Status: SHIPPED | OUTPATIENT
Start: 2021-12-20 | End: 2023-02-06

## 2021-12-20 RX ORDER — CLOPIDOGREL BISULFATE 75 MG/1
75 TABLET ORAL DAILY
Qty: 90 TABLET | Refills: 4 | Status: SHIPPED | OUTPATIENT
Start: 2021-12-20 | End: 2023-02-06

## 2021-12-20 RX ORDER — METOPROLOL SUCCINATE 25 MG/1
12.5 TABLET, EXTENDED RELEASE ORAL DAILY
Qty: 45 TABLET | Refills: 3 | Status: SHIPPED | OUTPATIENT
Start: 2021-12-20 | End: 2022-09-09

## 2021-12-20 NOTE — PROGRESS NOTES
HEART CARE ENCOUNTER CONSULTATON NOTE      [facility]  [phone]      Assessment/Recommendations   Assessment/Plan:  1.  Coronary artery disease status post drug-eluting stent to the mid RCA with a non-ST segment myocardial infarction August 21, 2021.  Likely related to plaque rupture.  He had a CT angiogram in approximately 2017 that demonstrated mild nonobstructive disease.  He is doing well clinically with documented normal LV function at the time of the myocardial infarction with an EF of 60% and mild hypokinesis of the inferior wall.  We discussed the importance of his medication regimen.  He is going to be traveling to [place] and we talked about monitoring for any symptoms and taking his nitroglycerin with him.  ECG today sinus rhythm, nonspecific T wave changes improved in the inferior leads normal QT.    2.  History of paroxysmal atrial fibrillation.  Longstanding.  He has been on a combination of digoxin with as needed propafenone in the past.  He is on 0.25 mg of digoxin which she has been on for some time.  I indicated that it might be reasonable to try to decrease the digoxin but given that he is leaving town we decided not to make too many changes.  He was discharged from the hospital on 6.25 mg of metoprolol and finds it very difficult to cut the dose into 4 divided pieces and therefore he will increase to 12.5 mg daily monitoring blood pressure and pulse.  We talked about the risk of bleeding related to the combination of Plavix and Eliquis and I did give him a pamphlet as related to the watchman device.  He does use a home monitor device but the rhythm strips that I was able to review are somewhat challenging as a relates to artifact.    3.  Dyslipidemia.  Now on atorvastatin 40 mg daily.  Lipids from October 2021 demonstrates a cholesterol 125 down from 144 1 year ago and an LDL down to 60.    1.  Follow-up in 6 months when he returns from [place]  2.  Continue documentation  of paroxysmal atrial fibrillation.  I asked him to try to document additional atrial fibrillation with the device.       History of Present Illness/Subjective    HPI: Ramesh Pate is a 76 year old male who is seen in follow-up.  I have reviewed the chart record.  He underwent a prostate biopsy in August and 2 days later developed chest discomfort and subsequently was admitted with a non-ST segment myocardial infarction to [facility].  He underwent stenting to a an 80% stenosis in the right coronary artery.  LV function was said to be normal with mild hypokinesis of the basilar inferior wall.  He reports that he has been feeling well.  He participated in cardiac rehabilitation.  He has not had any complaints of chest discomfort.  He has a history of paroxysmal atrial fibrillation which he is treated with infrequent use of as needed propafenone.  He saw my colleague  and was started on a combination of Eliquis in addition to Plavix for the coronary stent and aspirin was discontinued.  We discussed the potential risks of bleeding and the importance of avoiding other potential blood thinners specifically Motrin or other nonsteroidal anti-inflammatory medications and aspirin.  I did review the risk of bleeding with him.  His chads vascular score is calculated at 3.  He gets a point for coronary artery disease and 2 points for age.  We discussed alternatives to Eliquis today.  He had a syncopal event in August of soon after discharge from the hospital at [facility].  He was evaluated in [facility].  It sounds as if his blood pressure was low and this prompted him to take nitroglycerin x2 which resulted in a syncopal event.  He has had no additional syncopal events.  We talked about when to utilize nitroglycerin and to lay down if he does utilize nitroglycerin.    Recent Echocardiogram Results:  DANE Camarena MD - 08/24/2021   Formatting of this note might be different from the original.   Summary     1.  Echo  8/24/2021 9:31:00 AM.     2. The left ventricular chamber dimension is  normal and systolic   function   is normal  with an estimated ejection fraction of 60%.     3. The basal inferior wall is mildly hypokinetic.     4. There is mildly increased left ventricular wall thickness.     5. The right ventricular chamber dimension is borderline mildly enlarged   and   systolic function is normal.     6. There is trace aortic valve regurgitation.     7. There is trace mitral valve regurgitation.     8. There is trace tricuspid valve regurgitation.     9. There is trace pulmonic regurgitation.     10. The aortic root is mildly dilated measuring 4.10 cm with an index of   2.03 cm/m2.     11. The proximal ascending aorta is moderately dilated measuring 4.60 cm   with an index of 2.28 cm/m2.     12. There is no pericardial effusion.       Recent Coronary Angiogram Results:  Conclusions     1. NSTEMI.     2. LAD and LCx with minimal CAD, no significant stenoses.     3. Mid RCA 80% stenosis. Uneventful PCI with 4.5x20 YESY.     4. LVEDP 10mmHg.     Recommendations     * Plavix for at least one year and Aspirin indefinitely.       Diagnostic Findings     * Left Main has no disease.     * Left Anterior Descending has no disease.     * Circumflex has no disease.     * Mid Right Coronary Artery: significant  80% stenosis, DEVAN: 2 flow.     * Coronary angiography shows right dominance.       Cath Hemodynamic Data   Pressure Summary       Phase:Baseline       AO :  114 mmHg / 82 mmHg ( 83 mmHg )  @ 2:03:16 PM             126 mmHg / 68 mmHg ( 94 mmHg )  @ 2:07:17 PM       LV :  127 mmHg / 1 mmHg / 11 mmHg  @ 2:07:02 PM             128 mmHg / 1 mmHg / 13 mmHg  @ 2:07:09 PM             123 mmHg / 0 mmHg / 10 mmHg  @ 2:07:10 PM       AO HR:  84 bpm  @ 2:03:16 PM               69 bpm  @ 2:07:17 PM       LV HR:  73 bpm  @ 2:07:02 PM               69 bpm  @ 2:07:09 PM               71 bpm  @ 2:07:10 PM       LV DP/DT:  2,293 mmHg/s  @  2:07:02 PM                  2,344 mmHg/s  @ 2:07:09 PM                  2,395 mmHg/s  @ 2:07:10 PM       Summary of Procedure Medications     * VERSED 5 mg IV.     * Fentanyl 250 mcg IV.     * Verapamil 200 mcg IA.     * Nitroglycerin 200 mcg IA.     * HEPARIN BOLUS 16,000 units IV.     * PLAVIX (clopidogrel) 600 mg PO.       Report Signatures   Finalized by Ramakrishna Elam MD on 8/23/2021 03:00 PM       EXAM: XR CHEST 2 VIEWS PA AND LATERAL   LOCATION: [facility]   DATE/TIME: 8/28/2021 1:47 PM     INDICATION: SOB   COMPARISON: CT from 8/24/2021     IMPRESSION: Heart size is normal. Calcified aortic arch. Lungs are clear. No pneumothorax or pleural effusion.    Other Result Text    Dale Blancas MD - 08/28/2021   Formatting of this note might be different from the original.   For Patients: As a result of the 21st Century Cures Act, medical imaging exams and procedure reports are released immediately into your electronic medical record. You may view this report before your referring provider. If you have questions, please contact your health care provider.     EXAM: XR CHEST 2 VIEWS PA AND LATERAL   LOCATION: [facility]   DATE/TIME: 8/28/2021 1:47 PM     INDICATION: SOB   COMPARISON: CT from 8/24/2021     IMPRESSION: Heart size is normal. Calcified aortic arch. Lungs are clear. No pneumothorax or pleural effusion.  Specimen Collected: --   Date: 08/28/21   Received From: [employer] [facility]        EXAM: CT CHEST WITHOUT CONTRAST AND CT CHEST, ABDOMEN AND PELVIS WITH CONTRAST   LOCATION: [facility]   DATE/TIME: 8/24/2021 10:05 PM     INDICATION: Chest pain.   COMPARISON: 02/08/2013 - CT abdomen and pelvis.     TECHNIQUE: CT scan of the chest was performed without IV contrast. CT angiogram chest, abdomen and pelvis during arterial phase of injection of IV contrast. Dose reduction techniques were used.   CONTRAST: 100  mL iohexol-350.     FINDINGS:     ANGIOGRAM CHEST, ABDOMEN, AND PELVIS: Atherosclerotic calcification in the aorta. The aorta is normal in caliber without dissection.     LUNGS AND PLEURA: Small calcified granuloma in the left lung base. Curvilinear opacities in the mid aspects of bilateral lower lobes and in the right lung apex likely represent atelectasis and/or scarring.     MEDIASTINUM/AXILLAE: A few calcified mediastinal lymph nodes likely relate to prior granulomatous infection.     CORONARY ARTERY CALCIFICATION: Present.     HEPATOBILIARY: A few calcified granulomas in the liver.     SPLEEN: A few calcified granulomas in the spleen.     PANCREAS: Unremarkable.     ADRENAL GLANDS: Unremarkable.     KIDNEYS/BLADDER: Unremarkable.     BOWEL: A few colonic diverticula are present without evidence of diverticulitis. The appendix is not visualized.     LYMPH NODES: Unremarkable.     PELVIC ORGANS: Moderate enlargement of the prostate gland.     MUSCULOSKELETAL: No acute findings.     OTHER: None.     IMPRESSION:   1. No acute abnormality identified in the chest, abdomen or pelvis.   2. The aorta is normal in caliber without dissection.  ECG from August 24, 2021 from regions demonstrated sinus rhythm with inferior T wave changes.  ECG today sinus rhythm, nonspecific T wave changes improved.     Physical Examination  Review of Systems   Vitals: There were no vitals taken for this visit.  BMI= There is no height or weight on file to calculate BMI.  Wt Readings from Last 3 Encounters:   09/21/21 78.9 kg (174 lb)   05/02/17 79.4 kg (175 lb)   03/29/17 76.7 kg (169 lb)       General Appearance:   no distress, normal body habitus   ENT/Mouth: membranes moist, no oral lesions or bleeding gums.      EYES:  no scleral icterus, normal conjunctivae   Neck: no carotid bruits or thyromegaly   Chest/Lungs:   lungs are clear to auscultation, no rales or wheezing, , equal chest wall expansion    Cardiovascular:   Regular. Normal  first and second heart sounds with no murmurs, rubs, or gallops; the carotid, radial and posterior tibial pulses are intact, Jugular venous pressure within normal limits, no edema bilaterally    Abdomen:  no organomegaly, masses, bruits, or tenderness; bowel sounds are present   Extremities: no cyanosis or clubbing   Skin: no xanthelasma, warm.    Neurologic: normal  bilateral, no tremors     Psychiatric: alert and oriented x3, calm        Please refer above for cardiac ROS details.        Medical History  Surgical History Family History Social History   No past medical history on file.  Past Surgical History:   Procedure Laterality Date     APPENDECTOMY  1966     REVISE SECONDARY VARICOSITY      Description: Varicose Vein Ligation;  Recorded: 03/07/2013;     Family History   Problem Relation Age of Onset     Liver Cancer Mother      Lung Cancer Father      No Known Problems Sister      Parkinsonism Sister      No Known Problems Daughter      No Known Problems Daughter      No Known Problems Daughter         Social History     Socioeconomic History     Marital status:      Spouse name: Not on file     Number of children: 3     Years of education: Not on file     Highest education level: Not on file   Occupational History     Not on file   Tobacco Use     Smoking status: Never Smoker     Smokeless tobacco: Never Used   Substance and Sexual Activity     Alcohol use: No     Drug use: No     Sexual activity: Yes     Partners: Female     Birth control/protection: Post-menopausal   Other Topics Concern     Not on file   Social History Narrative    Bought a condo on the [address] and was remodeling it during January and February to rent it. He enjoys walking, fishing, house projects and volunteers at the "[employer] and is the  for his [employer].     Social Determinants of Health     Financial Resource Strain: Not on file   Food Insecurity: Not on file   Transportation Needs: Not on  file   Physical Activity: Not on file   Stress: Not on file   Social Connections: Not on file   Intimate Partner Violence: Not on file   Housing Stability: Not on file           Medications  Allergies   Current Outpatient Medications   Medication Sig Dispense Refill     apixaban ANTICOAGULANT (ELIQUIS ANTICOAGULANT) 5 MG tablet Take 1 tablet (5 mg) by mouth 2 times daily 60 tablet 11     atorvastatin (LIPITOR) 40 MG tablet Take 40 mg by mouth       clopidogrel (PLAVIX) 75 MG tablet Take 75 mg by mouth       digoxin (LANOXIN) 250 mcg tablet [DIGOXIN (LANOXIN) 250 MCG TABLET] Take 250 mcg by mouth daily.       esomeprazole (NEXIUM) 40 MG DR capsule Take 40 mg by mouth       metoprolol succinate ER (TOPROL-XL) 25 MG 24 hr tablet Take 6.25 mg by mouth       MULTIVITAMIN (MULTIPLE VITAMINS ORAL) [MULTIVITAMIN (MULTIPLE VITAMINS ORAL)] Take 1 tablet by mouth daily.       pramipexole (MIRAPEX) 0.75 MG tablet [PRAMIPEXOLE (MIRAPEX) 0.75 MG TABLET] Take 1/2 tablet daily       VIT C/VIT E/LUTEIN/MIN/OMEGA-3 (OCUVITE ORAL) [VIT C/VIT E/LUTEIN/MIN/OMEGA-3 (OCUVITE ORAL)] Take 1 tablet by mouth daily.       No Known Allergies       Lab Results    Chemistry/lipid CBC Cardiac Enzymes/BNP/TSH/INR   Recent Labs   Lab Test 10/06/21  0851   CHOL 125   HDL 50   LDL 60   TRIG 74     Recent Labs   Lab Test 10/06/21  0851 09/11/20  0945 12/17/19  0947   LDL 60 74 80     Recent Labs   Lab Test 06/11/19  1216   GLC 89     No results for input(s): CR in the last 51689 hours.  No results for input(s): A1C in the last 15408 hours.       No results for input(s): WBC, HGB, HCT, MCV, PLT in the last 04368 hours.  No results for input(s): HGB in the last 20897 hours. No results for input(s): TROPONINI in the last 29762 hours.  No results for input(s): BNP, NTBNPI, NTBNP in the last 59243 hours.  No results for input(s): TSH in the last 65249 hours.  No results for input(s): INR in the last 19200 hours.     Rashid Castaneda,  MD

## 2021-12-20 NOTE — PATIENT INSTRUCTIONS
Please try to avoid medicines like aspirin,motrin, advil, naprosyn.Ok to take Tylenol.Please send any rhythm strips that you are thinking might be a fib which you can send via my chart.Please call my nurse Millicent with any heart related questions.Her number is [phone].Please let me know if you have symptoms of dizziness or if your blood pressure is running less than 100  Or heart rate less than 60.  We did discuss taking an additional 12.5 mg of metoprolol for recurrent persistent atrial fibrillation.  We will plan to follow-up when you return from [place].

## 2021-12-20 NOTE — LETTER
12/20/2021    Luis Manuel Robertson MD  [facility] [address]  [facility] [address]    RE: Ramesh Pate       Dear Colleague,     I had the pleasure of seeing Ramesh Pate in the [facility].    HEART CARE ENCOUNTER CONSULTATON NOTE      [facility]  [phone]      Assessment/Recommendations   Assessment/Plan:  1.  Coronary artery disease status post drug-eluting stent to the mid RCA with a non-ST segment myocardial infarction August 21, 2021.  Likely related to plaque rupture.  He had a CT angiogram in approximately 2017 that demonstrated mild nonobstructive disease.  He is doing well clinically with documented normal LV function at the time of the myocardial infarction with an EF of 60% and mild hypokinesis of the inferior wall.  We discussed the importance of his medication regimen.  He is going to be traveling to [place] and we talked about monitoring for any symptoms and taking his nitroglycerin with him.  ECG today sinus rhythm, nonspecific T wave changes improved in the inferior leads normal QT.    2.  History of paroxysmal atrial fibrillation.  Longstanding.  He has been on a combination of digoxin with as needed propafenone in the past.  He is on 0.25 mg of digoxin which she has been on for some time.  I indicated that it might be reasonable to try to decrease the digoxin but given that he is leaving town we decided not to make too many changes.  He was discharged from the hospital on 6.25 mg of metoprolol and finds it very difficult to cut the dose into 4 divided pieces and therefore he will increase to 12.5 mg daily monitoring blood pressure and pulse.  We talked about the risk of bleeding related to the combination of Plavix and Eliquis and I did give him a pamphlet as related to the watchman device.  He does use a home monitor device but the rhythm strips that I was able to review are somewhat challenging as a relates to artifact.    3.   Dyslipidemia.  Now on atorvastatin 40 mg daily.  Lipids from October 2021 demonstrates a cholesterol 125 down from 144 1 year ago and an LDL down to 60.    1.  Follow-up in 6 months when he returns from [place]  2.  Continue documentation of paroxysmal atrial fibrillation.  I asked him to try to document additional atrial fibrillation with the device.       History of Present Illness/Subjective    HPI: Ramesh Pate is a 76 year old male who is seen in follow-up.  I have reviewed the chart record.  He underwent a prostate biopsy in August and 2 days later developed chest discomfort and subsequently was admitted with a non-ST segment myocardial infarction to [facility].  He underwent stenting to a an 80% stenosis in the right coronary artery.  LV function was said to be normal with mild hypokinesis of the basilar inferior wall.  He reports that he has been feeling well.  He participated in cardiac rehabilitation.  He has not had any complaints of chest discomfort.  He has a history of paroxysmal atrial fibrillation which he is treated with infrequent use of as needed propafenone.  He saw my colleague  and was started on a combination of Eliquis in addition to Plavix for the coronary stent and aspirin was discontinued.  We discussed the potential risks of bleeding and the importance of avoiding other potential blood thinners specifically Motrin or other nonsteroidal anti-inflammatory medications and aspirin.  I did review the risk of bleeding with him.  His chads vascular score is calculated at 3.  He gets a point for coronary artery disease and 2 points for age.  We discussed alternatives to Eliquis today.  He had a syncopal event in August of soon after discharge from the hospital at [facility].  He was evaluated in [facility].  It sounds as if his blood pressure was low and this prompted him to take nitroglycerin x2 which resulted in a syncopal event.  He has had no additional syncopal events.  We  talked about when to utilize nitroglycerin and to lay down if he does utilize nitroglycerin.    Recent Echocardiogram Results:  DANE Camarena MD - 08/24/2021   Formatting of this note might be different from the original.   Summary     1. Echo  8/24/2021 9:31:00 AM.     2. The left ventricular chamber dimension is  normal and systolic   function   is normal  with an estimated ejection fraction of 60%.     3. The basal inferior wall is mildly hypokinetic.     4. There is mildly increased left ventricular wall thickness.     5. The right ventricular chamber dimension is borderline mildly enlarged   and   systolic function is normal.     6. There is trace aortic valve regurgitation.     7. There is trace mitral valve regurgitation.     8. There is trace tricuspid valve regurgitation.     9. There is trace pulmonic regurgitation.     10. The aortic root is mildly dilated measuring 4.10 cm with an index of   2.03 cm/m2.     11. The proximal ascending aorta is moderately dilated measuring 4.60 cm   with an index of 2.28 cm/m2.     12. There is no pericardial effusion.       Recent Coronary Angiogram Results:  Conclusions     1. NSTEMI.     2. LAD and LCx with minimal CAD, no significant stenoses.     3. Mid RCA 80% stenosis. Uneventful PCI with 4.5x20 YESY.     4. LVEDP 10mmHg.     Recommendations     * Plavix for at least one year and Aspirin indefinitely.       Diagnostic Findings     * Left Main has no disease.     * Left Anterior Descending has no disease.     * Circumflex has no disease.     * Mid Right Coronary Artery: significant  80% stenosis, DEVAN: 2 flow.     * Coronary angiography shows right dominance.       Cath Hemodynamic Data   Pressure Summary       Phase:Baseline       AO :  114 mmHg / 82 mmHg ( 83 mmHg )  @ 2:03:16 PM             126 mmHg / 68 mmHg ( 94 mmHg )  @ 2:07:17 PM       LV :  127 mmHg / 1 mmHg / 11 mmHg  @ 2:07:02 PM             128 mmHg / 1 mmHg / 13 mmHg  @ 2:07:09 PM             123 mmHg  / 0 mmHg / 10 mmHg  @ 2:07:10 PM       AO HR:  84 bpm  @ 2:03:16 PM               69 bpm  @ 2:07:17 PM       LV HR:  73 bpm  @ 2:07:02 PM               69 bpm  @ 2:07:09 PM               71 bpm  @ 2:07:10 PM       LV DP/DT:  2,293 mmHg/s  @ 2:07:02 PM                  2,344 mmHg/s  @ 2:07:09 PM                  2,395 mmHg/s  @ 2:07:10 PM       Summary of Procedure Medications     * VERSED 5 mg IV.     * Fentanyl 250 mcg IV.     * Verapamil 200 mcg IA.     * Nitroglycerin 200 mcg IA.     * HEPARIN BOLUS 16,000 units IV.     * PLAVIX (clopidogrel) 600 mg PO.       Report Signatures   Finalized by Ramakrishna Elam MD on 8/23/2021 03:00 PM       EXAM: XR CHEST 2 VIEWS PA AND LATERAL   LOCATION: [facility]   DATE/TIME: 8/28/2021 1:47 PM     INDICATION: SOB   COMPARISON: CT from 8/24/2021     IMPRESSION: Heart size is normal. Calcified aortic arch. Lungs are clear. No pneumothorax or pleural effusion.    Other Result Text    Dale Blancas MD - 08/28/2021   Formatting of this note might be different from the original.   For Patients: As a result of the 21st Century Cures Act, medical imaging exams and procedure reports are released immediately into your electronic medical record. You may view this report before your referring provider. If you have questions, please contact your health care provider.     EXAM: XR CHEST 2 VIEWS PA AND LATERAL   LOCATION: [facility]   DATE/TIME: 8/28/2021 1:47 PM     INDICATION: SOB   COMPARISON: CT from 8/24/2021     IMPRESSION: Heart size is normal. Calcified aortic arch. Lungs are clear. No pneumothorax or pleural effusion.  Specimen Collected: --   Date: 08/28/21   [facility] [employer] [facility]        EXAM: CT CHEST WITHOUT CONTRAST AND CT CHEST, ABDOMEN AND PELVIS WITH CONTRAST   LOCATION: [facility]   DATE/TIME: 8/24/2021 10:05 PM     INDICATION: Chest pain.   COMPARISON: 02/08/2013  - CT abdomen and pelvis.     TECHNIQUE: CT scan of the chest was performed without IV contrast. CT angiogram chest, abdomen and pelvis during arterial phase of injection of IV contrast. Dose reduction techniques were used.   CONTRAST: 100 mL iohexol-350.     FINDINGS:     ANGIOGRAM CHEST, ABDOMEN, AND PELVIS: Atherosclerotic calcification in the aorta. The aorta is normal in caliber without dissection.     LUNGS AND PLEURA: Small calcified granuloma in the left lung base. Curvilinear opacities in the mid aspects of bilateral lower lobes and in the right lung apex likely represent atelectasis and/or scarring.     MEDIASTINUM/AXILLAE: A few calcified mediastinal lymph nodes likely relate to prior granulomatous infection.     CORONARY ARTERY CALCIFICATION: Present.     HEPATOBILIARY: A few calcified granulomas in the liver.     SPLEEN: A few calcified granulomas in the spleen.     PANCREAS: Unremarkable.     ADRENAL GLANDS: Unremarkable.     KIDNEYS/BLADDER: Unremarkable.     BOWEL: A few colonic diverticula are present without evidence of diverticulitis. The appendix is not visualized.     LYMPH NODES: Unremarkable.     PELVIC ORGANS: Moderate enlargement of the prostate gland.     MUSCULOSKELETAL: No acute findings.     OTHER: None.     IMPRESSION:   1. No acute abnormality identified in the chest, abdomen or pelvis.   2. The aorta is normal in caliber without dissection.  ECG from August 24, 2021 from regions demonstrated sinus rhythm with inferior T wave changes.  ECG today sinus rhythm, nonspecific T wave changes improved.     Physical Examination  Review of Systems   Vitals: There were no vitals taken for this visit.  BMI= There is no height or weight on file to calculate BMI.  Wt Readings from Last 3 Encounters:   09/21/21 78.9 kg (174 lb)   05/02/17 79.4 kg (175 lb)   03/29/17 76.7 kg (169 lb)       General Appearance:   no distress, normal body habitus   ENT/Mouth: membranes moist, no oral lesions or bleeding  gums.      EYES:  no scleral icterus, normal conjunctivae   Neck: no carotid bruits or thyromegaly   Chest/Lungs:   lungs are clear to auscultation, no rales or wheezing, , equal chest wall expansion    Cardiovascular:   Regular. Normal first and second heart sounds with no murmurs, rubs, or gallops; the carotid, radial and posterior tibial pulses are intact, Jugular venous pressure within normal limits, no edema bilaterally    Abdomen:  no organomegaly, masses, bruits, or tenderness; bowel sounds are present   Extremities: no cyanosis or clubbing   Skin: no xanthelasma, warm.    Neurologic: normal  bilateral, no tremors     Psychiatric: alert and oriented x3, calm        Please refer above for cardiac ROS details.        Medical History  Surgical History Family History Social History   No past medical history on file.  Past Surgical History:   Procedure Laterality Date     APPENDECTOMY  1966     REVISE SECONDARY VARICOSITY      Description: Varicose Vein Ligation;  Recorded: 03/07/2013;     Family History   Problem Relation Age of Onset     Liver Cancer Mother      Lung Cancer Father      No Known Problems Sister      Parkinsonism Sister      No Known Problems Daughter      No Known Problems Daughter      No Known Problems Daughter         Social History     Socioeconomic History     Marital status:      Spouse name: Not on file     Number of children: 3     Years of education: Not on file     Highest education level: Not on file   Occupational History     Not on file   Tobacco Use     Smoking status: Never Smoker     Smokeless tobacco: Never Used   Substance and Sexual Activity     Alcohol use: No     Drug use: No     Sexual activity: Yes     Partners: Female     Birth control/protection: Post-menopausal   Other Topics Concern     Not on file   Social History Narrative    Bought a condo on the [address] and was remodeling it during January and February to rent it. He enjoys walking, fishing,  house projects and volunteers at the [facility] and is the  for his [employer].     Social Determinants of Health     Financial Resource Strain: Not on file   Food Insecurity: Not on file   Transportation Needs: Not on file   Physical Activity: Not on file   Stress: Not on file   Social Connections: Not on file   Intimate Partner Violence: Not on file   Housing Stability: Not on file           Medications  Allergies   Current Outpatient Medications   Medication Sig Dispense Refill     apixaban ANTICOAGULANT (ELIQUIS ANTICOAGULANT) 5 MG tablet Take 1 tablet (5 mg) by mouth 2 times daily 60 tablet 11     atorvastatin (LIPITOR) 40 MG tablet Take 40 mg by mouth       clopidogrel (PLAVIX) 75 MG tablet Take 75 mg by mouth       digoxin (LANOXIN) 250 mcg tablet [DIGOXIN (LANOXIN) 250 MCG TABLET] Take 250 mcg by mouth daily.       esomeprazole (NEXIUM) 40 MG DR capsule Take 40 mg by mouth       metoprolol succinate ER (TOPROL-XL) 25 MG 24 hr tablet Take 6.25 mg by mouth       MULTIVITAMIN (MULTIPLE VITAMINS ORAL) [MULTIVITAMIN (MULTIPLE VITAMINS ORAL)] Take 1 tablet by mouth daily.       pramipexole (MIRAPEX) 0.75 MG tablet [PRAMIPEXOLE (MIRAPEX) 0.75 MG TABLET] Take 1/2 tablet daily       VIT C/VIT E/LUTEIN/MIN/OMEGA-3 (OCUVITE ORAL) [VIT C/VIT E/LUTEIN/MIN/OMEGA-3 (OCUVITE ORAL)] Take 1 tablet by mouth daily.       No Known Allergies       Lab Results    Chemistry/lipid CBC Cardiac Enzymes/BNP/TSH/INR   Recent Labs   Lab Test 10/06/21  0851   CHOL 125   HDL 50   LDL 60   TRIG 74     Recent Labs   Lab Test 10/06/21  0851 09/11/20  0945 12/17/19  0947   LDL 60 74 80     Recent Labs   Lab Test 06/11/19  1216   GLC 89     No results for input(s): CR in the last 03263 hours.  No results for input(s): A1C in the last 70379 hours.       No results for input(s): WBC, HGB, HCT, MCV, PLT in the last 94709 hours.  No results for input(s): HGB in the last 49980 hours. No results for input(s): TROPONINI in the last  62868 hours.  No results for input(s): BNP, NTBNPI, NTBNP in the last 01456 hours.  No results for input(s): TSH in the last 99278 hours.  No results for input(s): INR in the last 77684 hours.     Rashid Castaneda MD    Thank you for allowing me to participate in the care of your patient.      Sincerely,     Rashid Castaneda MD     [facility]  cc:   Rashid Castaneda MD  [address]  [place] [address]

## 2021-12-21 LAB
ATRIAL RATE - MUSE: 68 BPM
DIASTOLIC BLOOD PRESSURE - MUSE: NORMAL MMHG
INTERPRETATION ECG - MUSE: NORMAL
P AXIS - MUSE: 68 DEGREES
PR INTERVAL - MUSE: 250 MS
QRS DURATION - MUSE: 92 MS
QT - MUSE: 378 MS
QTC - MUSE: 401 MS
R AXIS - MUSE: 16 DEGREES
SYSTOLIC BLOOD PRESSURE - MUSE: NORMAL MMHG
T AXIS - MUSE: 7 DEGREES
VENTRICULAR RATE- MUSE: 68 BPM

## 2022-02-02 ENCOUNTER — TELEPHONE (OUTPATIENT)
Dept: CARDIOLOGY | Facility: CLINIC | Age: 77
End: 2022-02-02
Payer: MEDICARE

## 2022-02-02 NOTE — TELEPHONE ENCOUNTER
Pt LVM - asking if ok to swim which has caused his HR to go up to 130.  He is in [place].    Return call to pt - pt states his HR is regular, and comes back down at rest.  Explained that HR will go up with activity, and as long as he is not sustaining HR around 130 at rest he should be fine.    Pt verbalized understanding and had no questions.  -keith

## 2022-04-26 ENCOUNTER — LAB REQUISITION (OUTPATIENT)
Dept: LAB | Facility: CLINIC | Age: 77
End: 2022-04-26
Payer: MEDICARE

## 2022-04-26 DIAGNOSIS — I48.91 UNSPECIFIED ATRIAL FIBRILLATION (H): ICD-10-CM

## 2022-04-26 DIAGNOSIS — E78.5 HYPERLIPIDEMIA, UNSPECIFIED: ICD-10-CM

## 2022-04-26 DIAGNOSIS — C61 MALIGNANT NEOPLASM OF PROSTATE (H): ICD-10-CM

## 2022-04-26 LAB
ANION GAP SERPL CALCULATED.3IONS-SCNC: 10 MMOL/L (ref 5–18)
BUN SERPL-MCNC: 19 MG/DL (ref 8–28)
CALCIUM SERPL-MCNC: 9 MG/DL (ref 8.5–10.5)
CHLORIDE BLD-SCNC: 103 MMOL/L (ref 98–107)
CHOLEST SERPL-MCNC: 122 MG/DL
CO2 SERPL-SCNC: 27 MMOL/L (ref 22–31)
CREAT SERPL-MCNC: 1.16 MG/DL (ref 0.7–1.3)
DIGOXIN SERPL-MCNC: 0.4 UG/L
FASTING STATUS PATIENT QL REPORTED: NORMAL
GFR SERPL CREATININE-BSD FRML MDRD: 64 ML/MIN/1.73M2
GLUCOSE BLD-MCNC: 84 MG/DL (ref 70–125)
HDLC SERPL-MCNC: 54 MG/DL
LDLC SERPL CALC-MCNC: 56 MG/DL
POTASSIUM BLD-SCNC: 4.4 MMOL/L (ref 3.5–5)
PSA SERPL-MCNC: 1.95 UG/L (ref 0–6.5)
SODIUM SERPL-SCNC: 140 MMOL/L (ref 136–145)
TRIGL SERPL-MCNC: 59 MG/DL

## 2022-04-26 PROCEDURE — 80061 LIPID PANEL: CPT | Mod: ORL | Performed by: FAMILY MEDICINE

## 2022-04-26 PROCEDURE — 80048 BASIC METABOLIC PNL TOTAL CA: CPT | Mod: ORL | Performed by: FAMILY MEDICINE

## 2022-04-26 PROCEDURE — 80162 ASSAY OF DIGOXIN TOTAL: CPT | Mod: ORL | Performed by: FAMILY MEDICINE

## 2022-04-26 PROCEDURE — 84153 ASSAY OF PSA TOTAL: CPT | Mod: ORL | Performed by: FAMILY MEDICINE

## 2022-05-08 ENCOUNTER — HEALTH MAINTENANCE LETTER (OUTPATIENT)
Age: 77
End: 2022-05-08

## 2022-06-01 ENCOUNTER — TELEPHONE (OUTPATIENT)
Dept: CARDIOLOGY | Facility: CLINIC | Age: 77
End: 2022-06-01
Payer: MEDICARE

## 2022-06-01 NOTE — TELEPHONE ENCOUNTER
Pt has back problems and will be on a 6 hr car ride tomorrow.  He is asking if ok to use his tens unit on his hip.  Discussed with pt - per product literature, safe for use as long as not applied to chest.  Pt verbalized understanding and had no other questions.  -keith

## 2022-06-22 ENCOUNTER — OFFICE VISIT (OUTPATIENT)
Dept: CARDIOLOGY | Facility: CLINIC | Age: 77
End: 2022-06-22
Attending: INTERNAL MEDICINE
Payer: MEDICARE

## 2022-06-22 VITALS
BODY MASS INDEX: 22.53 KG/M2 | SYSTOLIC BLOOD PRESSURE: 140 MMHG | RESPIRATION RATE: 16 BRPM | WEIGHT: 170 LBS | HEIGHT: 73 IN | DIASTOLIC BLOOD PRESSURE: 62 MMHG | HEART RATE: 65 BPM

## 2022-06-22 DIAGNOSIS — I25.83 CORONARY ARTERY DISEASE DUE TO LIPID RICH PLAQUE: ICD-10-CM

## 2022-06-22 DIAGNOSIS — I25.10 CORONARY ARTERY DISEASE DUE TO LIPID RICH PLAQUE: ICD-10-CM

## 2022-06-22 DIAGNOSIS — I48.0 PAROXYSMAL ATRIAL FIBRILLATION (H): ICD-10-CM

## 2022-06-22 DIAGNOSIS — I25.10 CORONARY ARTERY DISEASE INVOLVING NATIVE CORONARY ARTERY OF NATIVE HEART WITHOUT ANGINA PECTORIS: ICD-10-CM

## 2022-06-22 PROCEDURE — 99214 OFFICE O/P EST MOD 30 MIN: CPT | Performed by: INTERNAL MEDICINE

## 2022-06-22 RX ORDER — NITROGLYCERIN 0.4 MG/1
TABLET SUBLINGUAL
COMMUNITY
Start: 2021-08-24 | End: 2022-09-26

## 2022-06-22 NOTE — LETTER
6/22/2022    Luis Manuel Robertson MD  [facility] [address]  [facility] [address]    RE: Ramesh Pate       Dear Colleague,     I had the pleasure of seeing Ramesh Pate in the [facility].    HEART CARE ENCOUNTER CONSULTATON NOTE      [facility]  [phone]      Assessment/Recommendations   Assessment/Plan:  1.  Coronary artery disease status post drug-eluting stent to the mid right coronary artery with non-ST segment myocardial infarction August 21, 2021.  Likely a plaque rupture.  CT angiogram 2017 demonstrated mild nonobstructive disease at that time.  Clinically doing well.  Echocardiogram at the time of myocardial infarction reported ejection fraction of 60% with mild hypokinesis of the inferior wall.  We are going to plan an exercise stress echocardiogram now approximately 1 year post coronary event.  He has an upcoming need for prostate biopsy and he was going to check with urology regarding the timing of the procedure and the request to discontinue Eliquis and Plavix for as short a time as possible.    2.  History of paroxysmal atrial fibrillation.  Longstanding.  He has chronically been on digoxin and as needed propafenone.  He takes low-dose metoprolol.  He is aware that we wish to avoid propafenone on a regular basis.  He states that he is taking it on 1 occasion since our last visit.  He will keep me updated with any specific symptoms.    3.  Dyslipidemia.  Lipids most recently evaluated April 2022 finds an LDL cholesterol of 56 on 40 mg of atorvastatin.    Plan exercise stress echocardiogram  Further recommendations pending input from urology  Follow-up 6 months       History of Present Illness/Subjective    HPI: Ramesh Pate is a 77 year old male who is seen in follow-up.  He reports that he has been feeling very well.  He has had no complaints of chest discomfort or other symptoms suggestive of progression of coronary artery disease.  He  does indicate that he is in need of a prostate biopsy and currently is on Eliquis and Plavix.  His coronary intervention dates to August 21, 2021 as outlined.  He reports infrequent episodes of atrial fibrillation.  He recalls a 4-hour event in March and this resolved after taking an extra dose of propafenone.  He does not utilize propafenone on a regular basis.  He has been maintained on low-dose metoprolol.  He reports that blood pressures range in the 130s over 70s to 80s range.  He will monitor and report his blood pressure to me.  Recent lipids are reviewed where his LDL was at goal at 56 April 26, 2022.    Recent Echocardiogram Results:  Narrative    Summary     1. Echo  8/24/2021 9:31:00 AM.     2. The left ventricular chamber dimension is  normal and systolic   function   is normal  with an estimated ejection fraction of 60%.     3. The basal inferior wall is mildly hypokinetic.     4. There is mildly increased left ventricular wall thickness.     5. The right ventricular chamber dimension is borderline mildly enlarged   and   systolic function is normal.     6. There is trace aortic valve regurgitation.     7. There is trace mitral valve regurgitation.     8. There is trace tricuspid valve regurgitation.     9. There is trace pulmonic regurgitation.     10. The aortic root is mildly dilated measuring 4.10 cm with an index of   2.03 cm/m2.     11. The proximal ascending aorta is moderately dilated measuring 4.60 cm   with an index of 2.28 cm/m2.     12. There is no pericardial effusion.        Recent Coronary Angiogram Results:     I21.4 - Non-ST elevation (NSTEMI) myocardial infarction       Conclusions     1. NSTEMI.     2. LAD and LCx with minimal CAD, no significant stenoses.     3. Mid RCA 80% stenosis. Uneventful PCI with 4.5x20 YESY.     4. LVEDP 10mmHg.     Recommendations     * Plavix for at least one year and Aspirin indefinitely.       Diagnostic Findings     * Left Main has no disease.     * Left  Anterior Descending has no disease.     * Circumflex has no disease.     * Mid Right Coronary Artery: significant  80% stenosis, DEVAN: 2 flow.     * Coronary angiography shows right dominance       degrees 16      T Axis degrees 7     Interpretation ECG  Sinus rhythm with 1st degree A-V block   Otherwise normal ECG   When compared with ECG of 05-JAN-2004 11:23,   WA interval has increased   Confirmed by LEIGH VAN MD LOC:WW ([id number]) on 12/21/2021 12:49:47 PM      degrees 7       Interpretation ECG  Sinus rhythm with 1st degree A-V block   Otherwise normal ECG   When compared with ECG of 05-JAN-2004 11:23,   WA interval has increased   Confirmed by LEIGH VAN MD LOC:WW ([id number]) on 12/21/2021 12:49:47 PM             Physical Examination  Review of Systems   Vitals: 140/62 upon arrival, 130/70 during my examination, weight 170 pounds, rate 65 and regular  Wt Readings from Last 3 Encounters:   12/20/21 78 kg (172 lb)   09/21/21 78.9 kg (174 lb)   05/02/17 79.4 kg (175 lb)       General Appearance:   no distress, normal body habitus   ENT/Mouth: membranes moist, no oral lesions or bleeding gums.      EYES:  no scleral icterus, normal conjunctivae   Neck: no carotid bruits or thyromegaly   Chest/Lungs:   lungs are clear to auscultation, no rales or wheezing, equal chest wall expansion    Cardiovascular:   Regular. Normal first and second heart sounds with no murmurs, rubs, or gallops; the carotid, radial and posterior tibial pulses are intact, Jugular venous pressure within normal limits, no edema bilaterally    Abdomen:  no organomegaly, masses, bruits, or tenderness; bowel sounds are present   Extremities: no cyanosis or clubbing   Skin: no xanthelasma, warm.    Neurologic: , no tremors     Psychiatric: alert and oriented x3, calm        Please refer above for cardiac ROS details.        Medical History  Surgical History Family History Social History   No past medical history on file.  Past Surgical History:    Procedure Laterality Date     APPENDECTOMY  1966     REVISE SECONDARY VARICOSITY      Description: Varicose Vein Ligation;  Recorded: 03/07/2013;     Family History   Problem Relation Age of Onset     Liver Cancer Mother      Lung Cancer Father      No Known Problems Sister      Parkinsonism Sister      No Known Problems Daughter      No Known Problems Daughter      No Known Problems Daughter         Social History     Socioeconomic History     Marital status:      Spouse name: Not on file     Number of children: 3     Years of education: Not on file     Highest education level: Not on file   Occupational History     Not on file   Tobacco Use     Smoking status: Never Smoker     Smokeless tobacco: Never Used   Substance and Sexual Activity     Alcohol use: No     Drug use: No     Sexual activity: Yes     Partners: Female     Birth control/protection: Post-menopausal   Other Topics Concern     Not on file   Social History Narrative    Bought a condo on the [facility] and was remodeling it during January and February to rent it. He enjoys walking, fishing, house projects and volunteers at [employer] and is the  for his [employer].     Social Determinants of Health     Financial Resource Strain: Not on file   Food Insecurity: Not on file   Transportation Needs: Not on file   Physical Activity: Not on file   Stress: Not on file   Social Connections: Not on file   Intimate Partner Violence: Not on file   Housing Stability: Not on file           Medications  Allergies   Current Outpatient Medications   Medication Sig Dispense Refill     apixaban ANTICOAGULANT (ELIQUIS ANTICOAGULANT) 5 MG tablet Take 1 tablet (5 mg) by mouth 2 times daily 180 tablet 4     atorvastatin (LIPITOR) 40 MG tablet Take 1 tablet (40 mg) by mouth daily 90 tablet 4     clopidogrel (PLAVIX) 75 MG tablet Take 1 tablet (75 mg) by mouth daily 90 tablet 4     digoxin (LANOXIN) 250 mcg tablet [DIGOXIN (LANOXIN) 250  MCG TABLET] Take 250 mcg by mouth daily.       esomeprazole (NEXIUM) 40 MG DR capsule Take 40 mg by mouth       metoprolol succinate ER (TOPROL-XL) 25 MG 24 hr tablet Take 0.5 tablets (12.5 mg) by mouth daily 45 tablet 3     MULTIVITAMIN (MULTIPLE VITAMINS ORAL) [MULTIVITAMIN (MULTIPLE VITAMINS ORAL)] Take 1 tablet by mouth daily.       pramipexole (MIRAPEX) 0.75 MG tablet [PRAMIPEXOLE (MIRAPEX) 0.75 MG TABLET] Take 1/2 tablet daily       VIT C/VIT E/LUTEIN/MIN/OMEGA-3 (OCUVITE ORAL) [VIT C/VIT E/LUTEIN/MIN/OMEGA-3 (OCUVITE ORAL)] Take 1 tablet by mouth daily.       No Known Allergies       Lab Results    Chemistry/lipid CBC Cardiac Enzymes/BNP/TSH/INR   Recent Labs   Lab Test 04/26/22  0940   CHOL 122   HDL 54   LDL 56   TRIG 59     Recent Labs   Lab Test 04/26/22  0940 10/06/21  0851 09/11/20  0945   LDL 56 60 74     Recent Labs   Lab Test 04/26/22  0940      POTASSIUM 4.4   CHLORIDE 103   CO2 27   GLC 84   BUN 19   CR 1.16   GFRESTIMATED 64   ONIEL 9.0     Recent Labs   Lab Test 04/26/22  0940   CR 1.16     No results for input(s): A1C in the last 33782 hours.       No results for input(s): WBC, HGB, HCT, MCV, PLT in the last 69721 hours.  No results for input(s): HGB in the last 73064 hours. No results for input(s): TROPONINI in the last 65156 hours.  No results for input(s): BNP, NTBNPI, NTBNP in the last 85947 hours.  No results for input(s): TSH in the last 29831 hours.  No results for input(s): INR in the last 39540 hours.     Rashid Castaneda MD    Thank you for allowing me to participate in the care of your patient.      Sincerely,     Rashid Castaneda MD     [facility]  cc:   August Valencia MD  [facility]  [facility] [address]  [place] [address]

## 2022-06-22 NOTE — PROGRESS NOTES
HEART CARE ENCOUNTER CONSULTATON NOTE      [facility]  [phone]      Assessment/Recommendations   Assessment/Plan:  1.  Coronary artery disease status post drug-eluting stent to the mid right coronary artery with non-ST segment myocardial infarction August 21, 2021.  Likely a plaque rupture.  CT angiogram 2017 demonstrated mild nonobstructive disease at that time.  Clinically doing well.  Echocardiogram at the time of myocardial infarction reported ejection fraction of 60% with mild hypokinesis of the inferior wall.  We are going to plan an exercise stress echocardiogram now approximately 1 year post coronary event.  He has an upcoming need for prostate biopsy and he was going to check with urology regarding the timing of the procedure and the request to discontinue Eliquis and Plavix for as short a time as possible.    2.  History of paroxysmal atrial fibrillation.  Longstanding.  He has chronically been on digoxin and as needed propafenone.  He takes low-dose metoprolol.  He is aware that we wish to avoid propafenone on a regular basis.  He states that he is taking it on 1 occasion since our last visit.  He will keep me updated with any specific symptoms.    3.  Dyslipidemia.  Lipids most recently evaluated April 2022 finds an LDL cholesterol of 56 on 40 mg of atorvastatin.    Plan exercise stress echocardiogram  Further recommendations pending input from urology  Follow-up 6 months       History of Present Illness/Subjective    HPI: Ramesh Pate is a 77 year old male who is seen in follow-up.  He reports that he has been feeling very well.  He has had no complaints of chest discomfort or other symptoms suggestive of progression of coronary artery disease.  He does indicate that he is in need of a prostate biopsy and currently is on Eliquis and Plavix.  His coronary intervention dates to August 21, 2021 as outlined.  He reports infrequent episodes of atrial fibrillation.  He recalls a  4-hour event in March and this resolved after taking an extra dose of propafenone.  He does not utilize propafenone on a regular basis.  He has been maintained on low-dose metoprolol.  He reports that blood pressures range in the 130s over 70s to 80s range.  He will monitor and report his blood pressure to me.  Recent lipids are reviewed where his LDL was at goal at 56 April 26, 2022.    Recent Echocardiogram Results:  Narrative    Summary     1. Echo  8/24/2021 9:31:00 AM.     2. The left ventricular chamber dimension is  normal and systolic   function   is normal  with an estimated ejection fraction of 60%.     3. The basal inferior wall is mildly hypokinetic.     4. There is mildly increased left ventricular wall thickness.     5. The right ventricular chamber dimension is borderline mildly enlarged   and   systolic function is normal.     6. There is trace aortic valve regurgitation.     7. There is trace mitral valve regurgitation.     8. There is trace tricuspid valve regurgitation.     9. There is trace pulmonic regurgitation.     10. The aortic root is mildly dilated measuring 4.10 cm with an index of   2.03 cm/m2.     11. The proximal ascending aorta is moderately dilated measuring 4.60 cm   with an index of 2.28 cm/m2.     12. There is no pericardial effusion.        Recent Coronary Angiogram Results:     I21.4 - Non-ST elevation (NSTEMI) myocardial infarction       Conclusions     1. NSTEMI.     2. LAD and LCx with minimal CAD, no significant stenoses.     3. Mid RCA 80% stenosis. Uneventful PCI with 4.5x20 YESY.     4. LVEDP 10mmHg.     Recommendations     * Plavix for at least one year and Aspirin indefinitely.       Diagnostic Findings     * Left Main has no disease.     * Left Anterior Descending has no disease.     * Circumflex has no disease.     * Mid Right Coronary Artery: significant  80% stenosis, DEVAN: 2 flow.     * Coronary angiography shows right dominance       degrees 16      T Axis degrees  7     Interpretation ECG  Sinus rhythm with 1st degree A-V block   Otherwise normal ECG   When compared with ECG of 05-JAN-2004 11:23,   WV interval has increased   Confirmed by LEIGH VAN MD LOC:WW ([id number]) on 12/21/2021 12:49:47 PM      degrees 7       Interpretation ECG  Sinus rhythm with 1st degree A-V block   Otherwise normal ECG   When compared with ECG of 05-JAN-2004 11:23,   WV interval has increased   Confirmed by LEIGH VAN MD LOC:WW ([id number]) on 12/21/2021 12:49:47 PM             Physical Examination  Review of Systems   Vitals: 140/62 upon arrival, 130/70 during my examination, weight 170 pounds, rate 65 and regular  Wt Readings from Last 3 Encounters:   12/20/21 78 kg (172 lb)   09/21/21 78.9 kg (174 lb)   05/02/17 79.4 kg (175 lb)       General Appearance:   no distress, normal body habitus   ENT/Mouth: membranes moist, no oral lesions or bleeding gums.      EYES:  no scleral icterus, normal conjunctivae   Neck: no carotid bruits or thyromegaly   Chest/Lungs:   lungs are clear to auscultation, no rales or wheezing, equal chest wall expansion    Cardiovascular:   Regular. Normal first and second heart sounds with no murmurs, rubs, or gallops; the carotid, radial and posterior tibial pulses are intact, Jugular venous pressure within normal limits, no edema bilaterally    Abdomen:  no organomegaly, masses, bruits, or tenderness; bowel sounds are present   Extremities: no cyanosis or clubbing   Skin: no xanthelasma, warm.    Neurologic: , no tremors     Psychiatric: alert and oriented x3, calm        Please refer above for cardiac ROS details.        Medical History  Surgical History Family History Social History   No past medical history on file.  Past Surgical History:   Procedure Laterality Date     APPENDECTOMY  1966     REVISE SECONDARY VARICOSITY      Description: Varicose Vein Ligation;  Recorded: 03/07/2013;     Family History   Problem Relation Age of Onset     Liver Cancer Mother       Lung Cancer Father      No Known Problems Sister      Parkinsonism Sister      No Known Problems Daughter      No Known Problems Daughter      No Known Problems Daughter         Social History     Socioeconomic History     Marital status:      Spouse name: Not on file     Number of children: 3     Years of education: Not on file     Highest education level: Not on file   Occupational History     Not on file   Tobacco Use     Smoking status: Never Smoker     Smokeless tobacco: Never Used   Substance and Sexual Activity     Alcohol use: No     Drug use: No     Sexual activity: Yes     Partners: Female     Birth control/protection: Post-menopausal   Other Topics Concern     Not on file   Social History Narrative    Bought a condo on the [facility] and was remodeling it during January and February to rent it. He enjoys walking, fishing, house projects and volunteers at [employer] and is the  for his [employer].     Social Determinants of Health     Financial Resource Strain: Not on file   Food Insecurity: Not on file   Transportation Needs: Not on file   Physical Activity: Not on file   Stress: Not on file   Social Connections: Not on file   Intimate Partner Violence: Not on file   Housing Stability: Not on file           Medications  Allergies   Current Outpatient Medications   Medication Sig Dispense Refill     apixaban ANTICOAGULANT (ELIQUIS ANTICOAGULANT) 5 MG tablet Take 1 tablet (5 mg) by mouth 2 times daily 180 tablet 4     atorvastatin (LIPITOR) 40 MG tablet Take 1 tablet (40 mg) by mouth daily 90 tablet 4     clopidogrel (PLAVIX) 75 MG tablet Take 1 tablet (75 mg) by mouth daily 90 tablet 4     digoxin (LANOXIN) 250 mcg tablet [DIGOXIN (LANOXIN) 250 MCG TABLET] Take 250 mcg by mouth daily.       esomeprazole (NEXIUM) 40 MG DR capsule Take 40 mg by mouth       metoprolol succinate ER (TOPROL-XL) 25 MG 24 hr tablet Take 0.5 tablets (12.5 mg) by mouth daily 45 tablet 3      MULTIVITAMIN (MULTIPLE VITAMINS ORAL) [MULTIVITAMIN (MULTIPLE VITAMINS ORAL)] Take 1 tablet by mouth daily.       pramipexole (MIRAPEX) 0.75 MG tablet [PRAMIPEXOLE (MIRAPEX) 0.75 MG TABLET] Take 1/2 tablet daily       VIT C/VIT E/LUTEIN/MIN/OMEGA-3 (OCUVITE ORAL) [VIT C/VIT E/LUTEIN/MIN/OMEGA-3 (OCUVITE ORAL)] Take 1 tablet by mouth daily.       No Known Allergies       Lab Results    Chemistry/lipid CBC Cardiac Enzymes/BNP/TSH/INR   Recent Labs   Lab Test 04/26/22  0940   CHOL 122   HDL 54   LDL 56   TRIG 59     Recent Labs   Lab Test 04/26/22  0940 10/06/21  0851 09/11/20  0945   LDL 56 60 74     Recent Labs   Lab Test 04/26/22  0940      POTASSIUM 4.4   CHLORIDE 103   CO2 27   GLC 84   BUN 19   CR 1.16   GFRESTIMATED 64   ONIEL 9.0     Recent Labs   Lab Test 04/26/22  0940   CR 1.16     No results for input(s): A1C in the last 88990 hours.       No results for input(s): WBC, HGB, HCT, MCV, PLT in the last 15535 hours.  No results for input(s): HGB in the last 24843 hours. No results for input(s): TROPONINI in the last 72559 hours.  No results for input(s): BNP, NTBNPI, NTBNP in the last 51269 hours.  No results for input(s): TSH in the last 27819 hours.  No results for input(s): INR in the last 41683 hours.     Rashid Castaneda MD

## 2022-06-22 NOTE — PATIENT INSTRUCTIONS
We are going to plan an exercise stress echocardiogram.Please let me know what the urologist needs regarding holding the plavix and eliquis.Length of time off? When can sahu resume the blood thinners and schedule the biopsy,Please send 8 to 10 resting blood pressure readings over the next 3 to 4 weeks and vary the time of day.My nurse is Qi and her number is [phone]

## 2022-06-24 ENCOUNTER — TELEPHONE (OUTPATIENT)
Dept: CARDIOLOGY | Facility: CLINIC | Age: 77
End: 2022-06-24

## 2022-06-24 NOTE — TELEPHONE ENCOUNTER
Pt on Eliquis and clopidogrel.  [facility] called - may need prostate biopsy in end of August and they want to discuss management of medications for biopsy.  No answer - will try again next week.  -keith

## 2022-07-08 ENCOUNTER — HOSPITAL ENCOUNTER (OUTPATIENT)
Dept: CARDIOLOGY | Facility: HOSPITAL | Age: 77
Discharge: HOME | End: 2022-07-08
Attending: INTERNAL MEDICINE | Admitting: INTERNAL MEDICINE
Payer: MEDICARE

## 2022-07-08 DIAGNOSIS — I25.83 CORONARY ARTERY DISEASE DUE TO LIPID RICH PLAQUE: ICD-10-CM

## 2022-07-08 DIAGNOSIS — I25.10 CORONARY ARTERY DISEASE DUE TO LIPID RICH PLAQUE: ICD-10-CM

## 2022-07-08 DIAGNOSIS — I25.10 CORONARY ARTERY DISEASE INVOLVING NATIVE CORONARY ARTERY OF NATIVE HEART WITHOUT ANGINA PECTORIS: ICD-10-CM

## 2022-07-08 DIAGNOSIS — I48.0 PAROXYSMAL ATRIAL FIBRILLATION (H): ICD-10-CM

## 2022-07-08 PROCEDURE — 93018 CV STRESS TEST I&R ONLY: CPT | Performed by: INTERNAL MEDICINE

## 2022-07-08 PROCEDURE — 93350 STRESS TTE ONLY: CPT | Mod: TC

## 2022-07-08 PROCEDURE — 93321 DOPPLER ECHO F-UP/LMTD STD: CPT | Mod: 26 | Performed by: INTERNAL MEDICINE

## 2022-07-08 PROCEDURE — 93017 CV STRESS TEST TRACING ONLY: CPT

## 2022-07-08 PROCEDURE — 93016 CV STRESS TEST SUPVJ ONLY: CPT | Performed by: INTERNAL MEDICINE

## 2022-07-08 PROCEDURE — 93350 STRESS TTE ONLY: CPT | Mod: 26 | Performed by: INTERNAL MEDICINE

## 2022-07-08 PROCEDURE — 93325 DOPPLER ECHO COLOR FLOW MAPG: CPT | Mod: TC

## 2022-07-08 PROCEDURE — 93325 DOPPLER ECHO COLOR FLOW MAPG: CPT | Mod: 26 | Performed by: INTERNAL MEDICINE

## 2022-10-10 ENCOUNTER — TELEPHONE (OUTPATIENT)
Dept: CARDIOLOGY | Facility: CLINIC | Age: 77
End: 2022-10-10

## 2022-10-10 DIAGNOSIS — R07.9 CHEST PAIN: Primary | ICD-10-CM

## 2022-10-10 NOTE — TELEPHONE ENCOUNTER
----- Message from Rashid Castaneda MD sent at 10/9/2022 11:33 AM CDT -----  Patient presented the ER with chest discomfort and has some intermittent chest discomfort with a prior stent to the RCA.  Can you please arrange a coronary CT angiogram.  He is leaving towards the end of the month for [place].  Thank you M DG    === CCTA ordered.  -keith

## 2022-10-11 NOTE — TELEPHONE ENCOUNTER
----- Message from Rashid Castaneda MD sent at 10/11/2022  7:33 AM CDT -----  Patient is having exertional chest discomfort.  I had originally ordered a CT angiogram but he would benefit from being seen in [facility] can we please arrange as soon as possible.  Thank you M DG    === Pt scheduled 10/19/22 with [facility].  -keith

## 2022-10-13 ENCOUNTER — TRANSFERRED RECORDS (OUTPATIENT)
Dept: HEALTH INFORMATION MANAGEMENT | Facility: CLINIC | Age: 77
End: 2022-10-13

## 2022-10-13 ENCOUNTER — LAB REQUISITION (OUTPATIENT)
Dept: LAB | Facility: CLINIC | Age: 77
End: 2022-10-13
Payer: MEDICARE

## 2022-10-13 DIAGNOSIS — E78.5 HYPERLIPIDEMIA, UNSPECIFIED: ICD-10-CM

## 2022-10-13 LAB
ALBUMIN SERPL BCG-MCNC: 4.4 G/DL (ref 3.5–5.2)
ALP SERPL-CCNC: 34 U/L (ref 40–129)
ALT SERPL W P-5'-P-CCNC: 21 U/L (ref 10–50)
ANION GAP SERPL CALCULATED.3IONS-SCNC: 11 MMOL/L (ref 7–15)
AST SERPL W P-5'-P-CCNC: 21 U/L (ref 10–50)
BILIRUB SERPL-MCNC: 0.9 MG/DL
BUN SERPL-MCNC: 17.9 MG/DL (ref 8–23)
CALCIUM SERPL-MCNC: 9.2 MG/DL (ref 8.8–10.2)
CHLORIDE SERPL-SCNC: 101 MMOL/L (ref 98–107)
CHOLEST SERPL-MCNC: 118 MG/DL
CREAT SERPL-MCNC: 0.94 MG/DL (ref 0.67–1.17)
DEPRECATED HCO3 PLAS-SCNC: 28 MMOL/L (ref 22–29)
GFR SERPL CREATININE-BSD FRML MDRD: 83 ML/MIN/1.73M2
GLUCOSE SERPL-MCNC: 86 MG/DL (ref 70–99)
HDLC SERPL-MCNC: 53 MG/DL
LDLC SERPL CALC-MCNC: 43 MG/DL
NONHDLC SERPL-MCNC: 65 MG/DL
POTASSIUM SERPL-SCNC: 4.4 MMOL/L (ref 3.4–5.3)
PROT SERPL-MCNC: 6.4 G/DL (ref 6.4–8.3)
SODIUM SERPL-SCNC: 140 MMOL/L (ref 136–145)
TRIGL SERPL-MCNC: 108 MG/DL

## 2022-10-13 PROCEDURE — 80061 LIPID PANEL: CPT | Mod: ORL | Performed by: FAMILY MEDICINE

## 2022-10-13 PROCEDURE — 80053 COMPREHEN METABOLIC PANEL: CPT | Mod: ORL | Performed by: FAMILY MEDICINE

## 2022-10-19 ENCOUNTER — HOSPITAL ENCOUNTER (OUTPATIENT)
Dept: CARDIOLOGY | Facility: CLINIC | Age: 77
Discharge: HOME | End: 2022-10-19
Attending: INTERNAL MEDICINE | Admitting: INTERNAL MEDICINE
Payer: MEDICARE

## 2022-10-19 ENCOUNTER — OFFICE VISIT (OUTPATIENT)
Dept: CARDIOLOGY | Facility: CLINIC | Age: 77
End: 2022-10-19
Payer: MEDICARE

## 2022-10-19 VITALS
SYSTOLIC BLOOD PRESSURE: 128 MMHG | HEART RATE: 64 BPM | OXYGEN SATURATION: 98 % | RESPIRATION RATE: 18 BRPM | BODY MASS INDEX: 22.56 KG/M2 | WEIGHT: 171 LBS | DIASTOLIC BLOOD PRESSURE: 60 MMHG

## 2022-10-19 DIAGNOSIS — R07.2 PRECORDIAL PAIN: Primary | ICD-10-CM

## 2022-10-19 DIAGNOSIS — R07.2 PRECORDIAL PAIN: ICD-10-CM

## 2022-10-19 PROCEDURE — 93016 CV STRESS TEST SUPVJ ONLY: CPT | Performed by: INTERNAL MEDICINE

## 2022-10-19 PROCEDURE — 93017 CV STRESS TEST TRACING ONLY: CPT

## 2022-10-19 PROCEDURE — 93325 DOPPLER ECHO COLOR FLOW MAPG: CPT | Mod: 26 | Performed by: GENERAL ACUTE CARE HOSPITAL

## 2022-10-19 PROCEDURE — 93350 STRESS TTE ONLY: CPT | Mod: TC

## 2022-10-19 PROCEDURE — 93018 CV STRESS TEST I&R ONLY: CPT | Performed by: GENERAL ACUTE CARE HOSPITAL

## 2022-10-19 PROCEDURE — 93321 DOPPLER ECHO F-UP/LMTD STD: CPT | Mod: 26 | Performed by: GENERAL ACUTE CARE HOSPITAL

## 2022-10-19 PROCEDURE — 99214 OFFICE O/P EST MOD 30 MIN: CPT | Performed by: INTERNAL MEDICINE

## 2022-10-19 PROCEDURE — 93350 STRESS TTE ONLY: CPT | Mod: 26 | Performed by: GENERAL ACUTE CARE HOSPITAL

## 2022-10-19 NOTE — PROGRESS NOTES
Cardiology Progress Note     Assessment:  Chest pain unclear etiology atypical angina versus GERD  Coronary artery disease with history of RCA stenting in August 2021, negative stress echo in July 2022  Paroxysmal atrial fibrillation, currently in sinus rhythm      Plan:  We went over today further evaluation of chest pain.  I offered him coronary angiogram but he declined.  He is leaving for [place] next week.  I would like to give him at least a stress test before departure.  We will repeat stress echo and make further decisions when results available    Subjective:   This is 77 year old male who comes in today to rapid access clinic because of recurrence of chest discomfort.  He states that he has been having retrosternal chest discomfort on and off for the last several weeks.  Occasionally it happens with exertion but also happens at rest.  One of the episodes occurred at rest and was quite severe.  He went to the local emergency room and [place].  He apparently had normal EKG and troponins.  He states that he can go for long walk or climb stairs without any chest discomfort.  Because of recurrence of chest discomfort he saw his primary physician who increased GERD medications.  He has a history of non-ST segment elevation myocardial infarction and stenting of RCA in August 2021.  He did not have any significant disease elsewhere.  He had normal stress echo in July 2022.  Because of recurrent atypical chest pain his primary cardiologist  scheduled him to undergo CT coronary angiogram in December 2022.    He denies any new episodes of heart palpitations or syncope.    Review of Systems:   Negative other than history of present illness    Objective:   /60 (BP Location: Left arm, Patient Position: Sitting, Cuff Size: Adult Regular)   Pulse 64   Resp 18   Wt 77.6 kg (171 lb)   SpO2 98%   BMI 22.56 kg/m    Physical Exam:  GENERAL: no distress  NECK: No JVD  LUNGS: Clear to auscultation.  CARDIAC:  regular rhythm, S1 & S2 normal.  No heaves, thrills, gallops or murmurs.  ABDOMEN: flat, negative hepatosplenomegaly, soft and non-tender.  EXTREMITIES: No evidence of cyanosis, clubbing or edema.    Current Outpatient Medications   Medication Sig Dispense Refill     apixaban ANTICOAGULANT (ELIQUIS ANTICOAGULANT) 5 MG tablet Take 1 tablet (5 mg) by mouth 2 times daily 180 tablet 4     atorvastatin (LIPITOR) 40 MG tablet Take 1 tablet (40 mg) by mouth daily 90 tablet 4     clopidogrel (PLAVIX) 75 MG tablet Take 1 tablet (75 mg) by mouth daily 90 tablet 4     digoxin (LANOXIN) 250 mcg tablet [DIGOXIN (LANOXIN) 250 MCG TABLET] Take 250 mcg by mouth daily.       esomeprazole (NEXIUM) 40 MG DR capsule Take 40 mg by mouth       metoprolol succinate ER (TOPROL XL) 25 MG 24 hr tablet TAKE ONE-HALF TABLET BY MOUTH ONCE DAILY 90 tablet 3     MULTIVITAMIN (MULTIPLE VITAMINS ORAL) [MULTIVITAMIN (MULTIPLE VITAMINS ORAL)] Take 1 tablet by mouth daily.       nitroGLYcerin (NITROSTAT) 0.4 MG sublingual tablet For chest pain place 1 tablet under the tongue every 5 minutes for 3 doses. If symptoms persist 5 minutes after 1st dose call 911. 25 tablet 4     pramipexole (MIRAPEX) 0.75 MG tablet [PRAMIPEXOLE (MIRAPEX) 0.75 MG TABLET] Take 1/2 tablet daily       VIT C/VIT E/LUTEIN/MIN/OMEGA-3 (OCUVITE ORAL) [VIT C/VIT E/LUTEIN/MIN/OMEGA-3 (OCUVITE ORAL)] Take 1 tablet by mouth daily.         Cardiographics:    Stress echo: July 2021  Normal     Lab Results    Chemistry/lipid CBC Cardiac Enzymes/BNP/TSH/INR   Recent Labs   Lab Test 10/13/22  1531   CHOL 118   HDL 53   LDL 43   TRIG 108     Recent Labs   Lab Test 10/13/22  1531 04/26/22  0940 10/06/21  0851   LDL 43 56 60     Recent Labs   Lab Test 10/13/22  1531      POTASSIUM 4.4   CHLORIDE 101   CO2 28   GLC 86   BUN 17.9   CR 0.94   GFRESTIMATED 83   ONIEL 9.2     Recent Labs   Lab Test 10/13/22  1531 04/26/22  0940   CR 0.94 1.16     No results for input(s): A1C in the last 40342  hours.       No results for input(s): WBC, HGB, HCT, MCV, PLT in the last 93026 hours.  No results for input(s): HGB in the last 86478 hours. No results for input(s): TROPONINI in the last 36766 hours.  No results for input(s): BNP, NTBNPI, NTBNP in the last 80939 hours.  No results for input(s): TSH in the last 28296 hours.  No results for input(s): INR in the last 86169 hours.

## 2022-10-19 NOTE — PATIENT INSTRUCTIONS
Ramesh Pate,    It was a pleasure to see you today at the [facility].     My recommendations after this visit include:    Stress echo    DIVINA Kapadia MD, FACC, ROGER

## 2022-10-19 NOTE — LETTER
10/19/2022    Luis Manuel Robertson MD  [address]  [facility] [address]    RE: Ramesh Pate       Dear Colleague,     I had the pleasure of seeing Ramesh Pate in the [facility].      Cardiology Progress Note     Assessment:  Chest pain unclear etiology atypical angina versus GERD  Coronary artery disease with history of RCA stenting in August 2021, negative stress echo in July 2022  Paroxysmal atrial fibrillation, currently in sinus rhythm      Plan:  We went over today further evaluation of chest pain.  I offered him coronary angiogram but he declined.  He is leaving for [place] next week.  I would like to give him at least a stress test before departure.  We will repeat stress echo and make further decisions when results available    Subjective:   This is 77 year old male who comes in today to rapid access clinic because of recurrence of chest discomfort.  He states that he has been having retrosternal chest discomfort on and off for the last several weeks.  Occasionally it happens with exertion but also happens at rest.  One of the episodes occurred at rest and was quite severe.  He went to the local emergency room and [place].  He apparently had normal EKG and troponins.  He states that he can go for long walk or climb stairs without any chest discomfort.  Because of recurrence of chest discomfort he saw his primary physician who increased GERD medications.  He has a history of non-ST segment elevation myocardial infarction and stenting of RCA in August 2021.  He did not have any significant disease elsewhere.  He had normal stress echo in July 2022.  Because of recurrent atypical chest pain his primary cardiologist  scheduled him to undergo CT coronary angiogram in December 2022.    He denies any new episodes of heart palpitations or syncope.    Review of Systems:   Negative other than history of present illness    Objective:   /60 (BP Location: Left arm, Patient Position:  Sitting, Cuff Size: Adult Regular)   Pulse 64   Resp 18   Wt 77.6 kg (171 lb)   SpO2 98%   BMI 22.56 kg/m    Physical Exam:  GENERAL: no distress  NECK: No JVD  LUNGS: Clear to auscultation.  CARDIAC: regular rhythm, S1 & S2 normal.  No heaves, thrills, gallops or murmurs.  ABDOMEN: flat, negative hepatosplenomegaly, soft and non-tender.  EXTREMITIES: No evidence of cyanosis, clubbing or edema.    Current Outpatient Medications   Medication Sig Dispense Refill     apixaban ANTICOAGULANT (ELIQUIS ANTICOAGULANT) 5 MG tablet Take 1 tablet (5 mg) by mouth 2 times daily 180 tablet 4     atorvastatin (LIPITOR) 40 MG tablet Take 1 tablet (40 mg) by mouth daily 90 tablet 4     clopidogrel (PLAVIX) 75 MG tablet Take 1 tablet (75 mg) by mouth daily 90 tablet 4     digoxin (LANOXIN) 250 mcg tablet [DIGOXIN (LANOXIN) 250 MCG TABLET] Take 250 mcg by mouth daily.       esomeprazole (NEXIUM) 40 MG DR capsule Take 40 mg by mouth       metoprolol succinate ER (TOPROL XL) 25 MG 24 hr tablet TAKE ONE-HALF TABLET BY MOUTH ONCE DAILY 90 tablet 3     MULTIVITAMIN (MULTIPLE VITAMINS ORAL) [MULTIVITAMIN (MULTIPLE VITAMINS ORAL)] Take 1 tablet by mouth daily.       nitroGLYcerin (NITROSTAT) 0.4 MG sublingual tablet For chest pain place 1 tablet under the tongue every 5 minutes for 3 doses. If symptoms persist 5 minutes after 1st dose call 911. 25 tablet 4     pramipexole (MIRAPEX) 0.75 MG tablet [PRAMIPEXOLE (MIRAPEX) 0.75 MG TABLET] Take 1/2 tablet daily       VIT C/VIT E/LUTEIN/MIN/OMEGA-3 (OCUVITE ORAL) [VIT C/VIT E/LUTEIN/MIN/OMEGA-3 (OCUVITE ORAL)] Take 1 tablet by mouth daily.         Cardiographics:    Stress echo: July 2021  Normal     Lab Results    Chemistry/lipid CBC Cardiac Enzymes/BNP/TSH/INR   Recent Labs   Lab Test 10/13/22  1531   CHOL 118   HDL 53   LDL 43   TRIG 108     Recent Labs   Lab Test 10/13/22  1531 04/26/22  0940 10/06/21  0851   LDL 43 56 60     Recent Labs   Lab Test 10/13/22  1531      POTASSIUM 4.4    CHLORIDE 101   CO2 28   GLC 86   BUN 17.9   CR 0.94   GFRESTIMATED 83   ONIEL 9.2     Recent Labs   Lab Test 10/13/22  1531 04/26/22  0940   CR 0.94 1.16     No results for input(s): A1C in the last 36292 hours.       No results for input(s): WBC, HGB, HCT, MCV, PLT in the last 34474 hours.  No results for input(s): HGB in the last 01499 hours. No results for input(s): TROPONINI in the last 81682 hours.  No results for input(s): BNP, NTBNPI, NTBNP in the last 46779 hours.  No results for input(s): TSH in the last 98878 hours.  No results for input(s): INR in the last 50897 hours.                     Thank you for allowing me to participate in the care of your patient.      Sincerely,     Wicho Kapadia MD     [facility]  cc:   No referring provider defined for this encounter.

## 2022-12-13 ENCOUNTER — HOSPITAL ENCOUNTER (OUTPATIENT)
Dept: CT IMAGING | Facility: CLINIC | Age: 77
Discharge: HOME | End: 2022-12-13
Attending: INTERNAL MEDICINE | Admitting: INTERNAL MEDICINE
Payer: MEDICARE

## 2022-12-13 VITALS
BODY MASS INDEX: 22.53 KG/M2 | HEIGHT: 73 IN | WEIGHT: 170 LBS | SYSTOLIC BLOOD PRESSURE: 122 MMHG | DIASTOLIC BLOOD PRESSURE: 62 MMHG

## 2022-12-13 DIAGNOSIS — R07.9 CHEST PAIN: ICD-10-CM

## 2022-12-13 LAB
BSA FOR ECHO PROCEDURE: 0 M2
CCTA AORTIC ROOT ANNULUS: 4
CREAT BLD-MCNC: 1 MG/DL (ref 0.7–1.3)
GFR SERPL CREATININE-BSD FRML MDRD: >60 ML/MIN/1.73M2

## 2022-12-13 PROCEDURE — 250N000009 HC RX 250: Performed by: INTERNAL MEDICINE

## 2022-12-13 PROCEDURE — G1010 CDSM STANSON: HCPCS

## 2022-12-13 PROCEDURE — 250N000011 HC RX IP 250 OP 636: Performed by: INTERNAL MEDICINE

## 2022-12-13 PROCEDURE — 75574 CT ANGIO HRT W/3D IMAGE: CPT | Mod: 26 | Performed by: INTERNAL MEDICINE

## 2022-12-13 PROCEDURE — G1010 CDSM STANSON: HCPCS | Performed by: INTERNAL MEDICINE

## 2022-12-13 PROCEDURE — 250N000013 HC RX MED GY IP 250 OP 250 PS 637: Performed by: INTERNAL MEDICINE

## 2022-12-13 PROCEDURE — 82565 ASSAY OF CREATININE: CPT

## 2022-12-13 PROCEDURE — 75574 CT ANGIO HRT W/3D IMAGE: CPT | Mod: ME

## 2022-12-13 RX ORDER — DILTIAZEM HYDROCHLORIDE 5 MG/ML
10 INJECTION INTRAVENOUS
Status: DISCONTINUED | OUTPATIENT
Start: 2022-12-13 | End: 2022-12-14 | Stop reason: HOSPADM

## 2022-12-13 RX ORDER — DILTIAZEM HYDROCHLORIDE 5 MG/ML
5 INJECTION INTRAVENOUS
Status: DISCONTINUED | OUTPATIENT
Start: 2022-12-13 | End: 2022-12-14 | Stop reason: HOSPADM

## 2022-12-13 RX ORDER — NITROGLYCERIN 0.4 MG/1
0.4 TABLET SUBLINGUAL ONCE
Status: COMPLETED | OUTPATIENT
Start: 2022-12-13 | End: 2022-12-13

## 2022-12-13 RX ORDER — METOPROLOL TARTRATE 1 MG/ML
5 INJECTION, SOLUTION INTRAVENOUS
Status: DISCONTINUED | OUTPATIENT
Start: 2022-12-13 | End: 2022-12-14 | Stop reason: HOSPADM

## 2022-12-13 RX ORDER — LIDOCAINE 40 MG/G
CREAM TOPICAL
Status: DISCONTINUED | OUTPATIENT
Start: 2022-12-13 | End: 2022-12-14 | Stop reason: HOSPADM

## 2022-12-13 RX ORDER — IOPAMIDOL 755 MG/ML
100 INJECTION, SOLUTION INTRAVASCULAR ONCE
Status: COMPLETED | OUTPATIENT
Start: 2022-12-13 | End: 2022-12-13

## 2022-12-13 RX ADMIN — IOPAMIDOL 100 ML: 755 INJECTION, SOLUTION INTRAVENOUS at 09:58

## 2022-12-13 RX ADMIN — METOPROLOL TARTRATE 5 MG: 1 INJECTION, SOLUTION INTRAVENOUS at 09:59

## 2022-12-13 RX ADMIN — NITROGLYCERIN 0.4 MG: 0.4 TABLET SUBLINGUAL at 09:56

## 2022-12-20 ENCOUNTER — OFFICE VISIT (OUTPATIENT)
Dept: CARDIOLOGY | Facility: CLINIC | Age: 77
End: 2022-12-20
Attending: INTERNAL MEDICINE
Payer: MEDICARE

## 2022-12-20 VITALS
WEIGHT: 171 LBS | HEIGHT: 73 IN | SYSTOLIC BLOOD PRESSURE: 132 MMHG | HEART RATE: 56 BPM | RESPIRATION RATE: 12 BRPM | BODY MASS INDEX: 22.66 KG/M2 | DIASTOLIC BLOOD PRESSURE: 64 MMHG

## 2022-12-20 DIAGNOSIS — Z13.6 ENCOUNTER FOR SCREENING FOR ABDOMINAL AORTIC ANEURYSM (AAA) IN PATIENT 50 YEARS OF AGE OR OLDER WITHOUT OTHER RISK FACTORS FOR AAA: Primary | ICD-10-CM

## 2022-12-20 DIAGNOSIS — I25.10 CORONARY ARTERY DISEASE INVOLVING NATIVE CORONARY ARTERY OF NATIVE HEART WITHOUT ANGINA PECTORIS: ICD-10-CM

## 2022-12-20 DIAGNOSIS — I25.83 CORONARY ARTERY DISEASE DUE TO LIPID RICH PLAQUE: ICD-10-CM

## 2022-12-20 DIAGNOSIS — I48.0 PAROXYSMAL ATRIAL FIBRILLATION (H): ICD-10-CM

## 2022-12-20 DIAGNOSIS — I25.10 CORONARY ARTERY DISEASE DUE TO LIPID RICH PLAQUE: ICD-10-CM

## 2022-12-20 PROCEDURE — 99214 OFFICE O/P EST MOD 30 MIN: CPT | Performed by: INTERNAL MEDICINE

## 2022-12-20 NOTE — PROGRESS NOTES
HEART CARE ENCOUNTER CONSULTATON NOTE      [facility]  [phone]      Assessment/Recommendations   Assessment/Plan:  1.  Coronary artery disease with prior drug-eluting stent to the mid right coronary artery in August 2021.  He had been experiencing some atypical chest discomfort which prompted an exercise stress echocardiogram.  This was negative for inducible ischemia by echocardiographic criteria but with EKG changes.  As outlined below he underwent coronary CT angiogram that demonstrated patent stent, mild disease in the other coronary territories except the distal portion of the circumflex which was reported small with potentially moderate stenosis.  We talked about ongoing medical management.  We talked about the higher risk of bleeding with combination of Eliquis and Plavix.  At this point we will continue this combination but at the 2-year nalini we will discuss whether we would discontinue Plavix.  He will keep me updated about any symptoms.    2.  History of paroxysmal atrial fibrillation.  Longstanding and infrequent.  He does take as needed propafenone.  We talked about given underlying coronary artery disease that we would want to avoid propafenone on a regular basis.  He will keep me updated with respect to any change in symptoms.    3.  Dyslipidemia.  Most recent lipids are excellent from October where the total cholesterol is 118 LDL 43 and normal liver function test.    4.  Abdominal aortic aneurysm screening.  Possible history of aneurysm in his father.  We will screen with an abdominal aortic ultrasound.    Plan as outlined above with follow-up in 6 months.         History of Present Illness/Subjective    HPI: Ramesh Pate is a 77 year old male who is seen in follow-up.  I reviewed with him the recent stress echocardiogram and subsequent CT angiogram.  This demonstrated the prior RCA stent to be patent.  There is perhaps moderate disease in the smaller distal portion of  the circumflex.  He is not describing any predictable anginal chest discomfort.  He does some occasional nonexertional epigastric discomfort and has an upcoming endoscopy.  Atrial fibrillation has been quiet.  He had a brief episode in September after having 1 alcoholic beverage.  He has decided not to partake in alcohol secondary to this relationship.  He continues to be active and spends most of his winter in [place].  Laboratory studies are reviewed with his cholesterol numbers are excellent.  We talked about continuing with the current dose of atorvastatin 40 mg daily.    Recent Echocardiogram Results:  Echo Stress Echocardiogram  Order: [id number]   Status: Edited Result - FINAL      Visible to patient: Yes (seen)      Next appt: 2022 at 09:20 AM in Cardiology (Rashid Castaneda MD)      Dx: Precordial pain      4 Result Notes     1 Patient Communication  Details    Reading Physician Reading Date Result Priority   Fausto Brandon MD  [phone] 10/19/2022      Narrative & Impression  [id number]  [id number]  [id number]  [id number]^[place] [address]     Name: MARINA MORGAN  MRN: [id number]  : 1945  Study Date: 10/19/2022 10:47 AM  Age: 77 yrs  Gender: Male  Patient Location: [facility]  Reason For Study: Precordial pain  Ordering Physician: MATA ENRIQUEZ  Referring Physician: MATA ENRIQUEZ  Performed By: RAMONA     BSA: 2.0 m2  Height: 73 in  Weight: 171 lb  HR: 70  BP: 128/78 mmHg  ______________________________________________________________________________  Procedure  Stress Echo Complete. Adequate quality two-dimensional was performed and  interpreted. Adequate quality color and spectral Doppler were performed and  interpreted.  ______________________________________________________________________________  Interpretation Summary  Stress findings:  1. Normal stress echocardiogram with no evidence of stress-induced ischemia.  Estimated post exercise left  ventricular ejection fraction is greater than  70%.  2. Abnormal stress EKG with development of 2 mm horizontal ST depressions in  leads V4-V6, II, III, and aVF returning to baseline 6:00 minutes into the  recovery period.  3. Exercise capacity is above average for age and gender. The patient  exercised for 10:00 minutes on the Luis Manuel protocol, achieving 11.7 METs and 89%  the age-predicted maximium heart rate. The patient experienced non-limiting  chest pain during exercise.     Rest findings:  1. Left ventricular size, wall thickness, and systolic function are normal.  The estimated left ventricular ejection fraction is 60-65%.  2. Right ventricular size and systolic function are normal.  3. No hemodynamically significant valvular abnormalities.  4. Borderline dilatation of the aortic root measuring 4.2 cm.     Compared to the prior study dated 2022 there has been no significant  change.  ______________________________________________________________________________  Stress  The patient exhibited chest pain during exercise.  Exercise was stopped due to fatigue.  RPP 02493.  This was an abnormal stress EKG.  There is 2mm maximal ST depression during peak exercise.  Arrhythmia induced during stress: occasional PVC's.  Target Heart Rate was achieved.  This was a normal stress echocardiogram with no evidence of stress-induced  ischemia.     Baseline  Resting ECG is normal.  The patient is in normal sinus rhythm.  Normal left          Recent Coronary Angiogram Results:  Patient Information    Patient Name   Ramesh Pate MRN   [id number] Legal Sex   Male              Age   1945 (77 year old)     Reason for Exam  Priority: Routine  Dx: Chest pain [R07.9 (ICD-10-CM)]     Indications    Chest pain [R07.9 (ICD-10-CM)]     Interpretation Summary      Right dominant coronary system.    Left main: Short left main with mild stenosis.    Left anterior descending artery: Mild nonobstructive stenosis identified in  proximal to midportion.    Left circumflex artery: Mild calcified plaque in proximal first obtuse marginal branch which is large vessel. Mid to distal circumflex is a small sized vessel and there appears to be at least moderate calcified stenosis in this area.    Right coronary artery. Stent in the proximal to mid vessel that appears patent. Mild mainly noncalcified plaque noted in distal right coronary artery.            Physical Examination  Review of Systems   Vitals: 132/64, weight 171 pounds, heart rate of 56 and regular  Wt Readings from Last 3 Encounters:   12/13/22 77.1 kg (170 lb)   10/19/22 77.6 kg (171 lb)   06/22/22 77.1 kg (170 lb)       General Appearance:   no distress, normal body habitus   ENT/Mouth:  Wearing a facemask.      EYES:  no scleral icterus, normal conjunctivae   Neck: no carotid bruits or thyromegaly   Chest/Lungs:   lungs are clear to auscultation, no rales or wheezing, *, equal chest wall expansion    Cardiovascular:    Mildly bradycardic, regular. Normal first and second heart sounds with no murmurs, rubs, or gallops; the carotid, radial and posterior tibial pulses are intact, Jugular venous pressure within normal limits, no edema bilaterally    Abdomen:  no  bruits, or tenderness; bowel sounds are present   Extremities: no cyanosis or clubbing   Skin: no xanthelasma, warm.    Neurologic: , no tremors     Psychiatric: alert and oriented x3, calm        Please refer above for cardiac ROS details.        Medical History  Surgical History Family History Social History   No past medical history on file.  Past Surgical History:   Procedure Laterality Date     APPENDECTOMY  1966     REVISE SECONDARY VARICOSITY      Description: Varicose Vein Ligation;  Recorded: 03/07/2013;     Family History   Problem Relation Age of Onset     Liver Cancer Mother      Lung Cancer Father      No Known Problems Sister      Parkinsonism Sister      No Known Problems Daughter      No Known Problems Daughter       No Known Problems Daughter         Social History     Socioeconomic History     Marital status:      Spouse name: Not on file     Number of children: 3     Years of education: Not on file     Highest education level: Not on file   Occupational History     Not on file   Tobacco Use     Smoking status: Never     Smokeless tobacco: Never   Substance and Sexual Activity     Alcohol use: No     Drug use: No     Sexual activity: Yes     Partners: Female     Birth control/protection: Post-menopausal   Other Topics Concern     Not on file   Social History Narrative    Bought a condo on the [facility] and was remodeling it during January and February to rent it. He enjoys walking, fishing, house projects and volunteers at [employer] and is the  for his [employer].     Social Determinants of Health     Financial Resource Strain: Not on file   Food Insecurity: Not on file   Transportation Needs: Not on file   Physical Activity: Not on file   Stress: Not on file   Social Connections: Not on file   Intimate Partner Violence: Not on file   Housing Stability: Not on file           Medications  Allergies   Current Outpatient Medications   Medication Sig Dispense Refill     apixaban ANTICOAGULANT (ELIQUIS ANTICOAGULANT) 5 MG tablet Take 1 tablet (5 mg) by mouth 2 times daily 180 tablet 4     atorvastatin (LIPITOR) 40 MG tablet Take 1 tablet (40 mg) by mouth daily 90 tablet 4     clopidogrel (PLAVIX) 75 MG tablet Take 1 tablet (75 mg) by mouth daily 90 tablet 4     digoxin (LANOXIN) 250 mcg tablet [DIGOXIN (LANOXIN) 250 MCG TABLET] Take 250 mcg by mouth daily.       esomeprazole (NEXIUM) 40 MG DR capsule Take 40 mg by mouth       metoprolol succinate ER (TOPROL XL) 25 MG 24 hr tablet TAKE ONE-HALF TABLET BY MOUTH ONCE DAILY 90 tablet 3     MULTIVITAMIN (MULTIPLE VITAMINS ORAL) [MULTIVITAMIN (MULTIPLE VITAMINS ORAL)] Take 1 tablet by mouth daily.       nitroGLYcerin (NITROSTAT) 0.4 MG sublingual  tablet For chest pain place 1 tablet under the tongue every 5 minutes for 3 doses. If symptoms persist 5 minutes after 1st dose call 911. 25 tablet 4     pramipexole (MIRAPEX) 0.75 MG tablet [PRAMIPEXOLE (MIRAPEX) 0.75 MG TABLET] Take 1/2 tablet daily       VIT C/VIT E/LUTEIN/MIN/OMEGA-3 (OCUVITE ORAL) [VIT C/VIT E/LUTEIN/MIN/OMEGA-3 (OCUVITE ORAL)] Take 1 tablet by mouth daily.       No Known Allergies       Lab Results    Chemistry/lipid CBC Cardiac Enzymes/BNP/TSH/INR   Recent Labs   Lab Test 10/13/22  1531   CHOL 118   HDL 53   LDL 43   TRIG 108     Recent Labs   Lab Test 10/13/22  1531 04/26/22  0940 10/06/21  0851   LDL 43 56 60     Recent Labs   Lab Test 12/13/22  0955 10/13/22  1531   NA  --  140   POTASSIUM  --  4.4   CHLORIDE  --  101   CO2  --  28   GLC  --  86   BUN  --  17.9   CR 1.0 0.94   GFRESTIMATED >60 83   ONIEL  --  9.2     Recent Labs   Lab Test 12/13/22  0955 10/13/22  1531 04/26/22  0940   CR 1.0 0.94 1.16     No results for input(s): A1C in the last 55621 hours.       No results for input(s): WBC, HGB, HCT, MCV, PLT in the last 45100 hours.  No results for input(s): HGB in the last 82843 hours. No results for input(s): TROPONINI in the last 50918 hours.  No results for input(s): BNP, NTBNPI, NTBNP in the last 99548 hours.  No results for input(s): TSH in the last 45975 hours.  No results for input(s): INR in the last 72221 hours.     Rashid Castaneda MD

## 2022-12-20 NOTE — PATIENT INSTRUCTIONS
Please call or write with any heart related questions.Please update me with any heart related symptoms.My nurse is Millicent and her number is [phone]

## 2022-12-20 NOTE — LETTER
12/20/2022    Luis Manuel Robertson MD  [address]  [facility] [address]    RE: Ramesh Pate       Dear Colleague,     I had the pleasure of seeing Ramesh Pate in the [facility].    HEART CARE ENCOUNTER CONSULTATON NOTE      [facility]  [phone]      Assessment/Recommendations   Assessment/Plan:  1.  Coronary artery disease with prior drug-eluting stent to the mid right coronary artery in August 2021.  He had been experiencing some atypical chest discomfort which prompted an exercise stress echocardiogram.  This was negative for inducible ischemia by echocardiographic criteria but with EKG changes.  As outlined below he underwent coronary CT angiogram that demonstrated patent stent, mild disease in the other coronary territories except the distal portion of the circumflex which was reported small with potentially moderate stenosis.  We talked about ongoing medical management.  We talked about the higher risk of bleeding with combination of Eliquis and Plavix.  At this point we will continue this combination but at the 2-year nalini we will discuss whether we would discontinue Plavix.  He will keep me updated about any symptoms.    2.  History of paroxysmal atrial fibrillation.  Longstanding and infrequent.  He does take as needed propafenone.  We talked about given underlying coronary artery disease that we would want to avoid propafenone on a regular basis.  He will keep me updated with respect to any change in symptoms.    3.  Dyslipidemia.  Most recent lipids are excellent from October where the total cholesterol is 118 LDL 43 and normal liver function test.    4.  Abdominal aortic aneurysm screening.  Possible history of aneurysm in his father.  We will screen with an abdominal aortic ultrasound.    Plan as outlined above with follow-up in 6 months.         History of Present Illness/Subjective    HPI: Ramesh Pate is a 77 year old male who is seen in follow-up.   I reviewed with him the recent stress echocardiogram and subsequent CT angiogram.  This demonstrated the prior RCA stent to be patent.  There is perhaps moderate disease in the smaller distal portion of the circumflex.  He is not describing any predictable anginal chest discomfort.  He does some occasional nonexertional epigastric discomfort and has an upcoming endoscopy.  Atrial fibrillation has been quiet.  He had a brief episode in September after having 1 alcoholic beverage.  He has decided not to partake in alcohol secondary to this relationship.  He continues to be active and spends most of his winter in [place].  Laboratory studies are reviewed with his cholesterol numbers are excellent.  We talked about continuing with the current dose of atorvastatin 40 mg daily.    Recent Echocardiogram Results:  Echo Stress Echocardiogram  Order: [id number]   Status: Edited Result - FINAL      Visible to patient: Yes (seen)      Next appt: 2022 at 09:20 AM in Cardiology (Rashid Castaneda MD)      Dx: Precordial pain      4 Result Notes     1 Patient Communication  Details    Reading Physician Reading Date Result Priority   Fausto Brandon MD  [phone] 10/19/2022      Narrative & Impression  [id number]  [id number]  [id number]  [id number]^ZOE^[place] [address]     Name: MARINA MORGAN  MRN: [id number]  : 1945  Study Date: 10/19/2022 10:47 AM  Age: 77 yrs  Gender: Male  Patient Location: [facility]  Reason For Study: Precordial pain  Ordering Physician: MATA ENRIQUEZ  Referring Physician: MATA ENRIQUEZ  Performed By: RAMONA     BSA: 2.0 m2  Height: 73 in  Weight: 171 lb  HR: 70  BP: 128/78 mmHg  ______________________________________________________________________________  Procedure  Stress Echo Complete. Adequate quality two-dimensional was performed and  interpreted. Adequate quality color and spectral Doppler were performed  and  interpreted.  ______________________________________________________________________________  Interpretation Summary  Stress findings:  1. Normal stress echocardiogram with no evidence of stress-induced ischemia.  Estimated post exercise left ventricular ejection fraction is greater than  70%.  2. Abnormal stress EKG with development of 2 mm horizontal ST depressions in  leads V4-V6, II, III, and aVF returning to baseline 6:00 minutes into the  recovery period.  3. Exercise capacity is above average for age and gender. The patient  exercised for 10:00 minutes on the Luis Manuel protocol, achieving 11.7 METs and 89%  the age-predicted maximium heart rate. The patient experienced non-limiting  chest pain during exercise.     Rest findings:  1. Left ventricular size, wall thickness, and systolic function are normal.  The estimated left ventricular ejection fraction is 60-65%.  2. Right ventricular size and systolic function are normal.  3. No hemodynamically significant valvular abnormalities.  4. Borderline dilatation of the aortic root measuring 4.2 cm.     Compared to the prior study dated 2022 there has been no significant  change.  ______________________________________________________________________________  Stress  The patient exhibited chest pain during exercise.  Exercise was stopped due to fatigue.  RPP [id number].  This was an abnormal stress EKG.  There is 2mm maximal ST depression during peak exercise.  Arrhythmia induced during stress: occasional PVC's.  Target Heart Rate was achieved.  This was a normal stress echocardiogram with no evidence of stress-induced  ischemia.     Baseline  Resting ECG is normal.  The patient is in normal sinus rhythm.  Normal left          Recent Coronary Angiogram Results:  Patient Information    Patient Name   Ramesh Pate MRN   [id number] Legal Sex   Male              Age   1945 (77 year old)     Reason for Exam  Priority: Routine  Dx: Chest pain [R07.9  (ICD-10-CM)]     Indications    Chest pain [R07.9 (ICD-10-CM)]     Interpretation Summary      Right dominant coronary system.    Left main: Short left main with mild stenosis.    Left anterior descending artery: Mild nonobstructive stenosis identified in proximal to midportion.    Left circumflex artery: Mild calcified plaque in proximal first obtuse marginal branch which is large vessel. Mid to distal circumflex is a small sized vessel and there appears to be at least moderate calcified stenosis in this area.    Right coronary artery. Stent in the proximal to mid vessel that appears patent. Mild mainly noncalcified plaque noted in distal right coronary artery.            Physical Examination  Review of Systems   Vitals: 132/64, weight 171 pounds, heart rate of 56 and regular  Wt Readings from Last 3 Encounters:   12/13/22 77.1 kg (170 lb)   10/19/22 77.6 kg (171 lb)   06/22/22 77.1 kg (170 lb)       General Appearance:   no distress, normal body habitus   ENT/Mouth:  Wearing a facemask.      EYES:  no scleral icterus, normal conjunctivae   Neck: no carotid bruits or thyromegaly   Chest/Lungs:   lungs are clear to auscultation, no rales or wheezing, *, equal chest wall expansion    Cardiovascular:    Mildly bradycardic, regular. Normal first and second heart sounds with no murmurs, rubs, or gallops; the carotid, radial and posterior tibial pulses are intact, Jugular venous pressure within normal limits, no edema bilaterally    Abdomen:  no  bruits, or tenderness; bowel sounds are present   Extremities: no cyanosis or clubbing   Skin: no xanthelasma, warm.    Neurologic: , no tremors     Psychiatric: alert and oriented x3, calm        Please refer above for cardiac ROS details.        Medical History  Surgical History Family History Social History   No past medical history on file.  Past Surgical History:   Procedure Laterality Date     APPENDECTOMY  1966     REVISE SECONDARY VARICOSITY      Description: Varicose  Vein Ligation;  Recorded: 03/07/2013;     Family History   Problem Relation Age of Onset     Liver Cancer Mother      Lung Cancer Father      No Known Problems Sister      Parkinsonism Sister      No Known Problems Daughter      No Known Problems Daughter      No Known Problems Daughter         Social History     Socioeconomic History     Marital status:      Spouse name: Not on file     Number of children: 3     Years of education: Not on file     Highest education level: Not on file   Occupational History     Not on file   Tobacco Use     Smoking status: Never     Smokeless tobacco: Never   Substance and Sexual Activity     Alcohol use: No     Drug use: No     Sexual activity: Yes     Partners: Female     Birth control/protection: Post-menopausal   Other Topics Concern     Not on file   Social History Narrative    Bought a condo on the [address] [address] [facility] and was remodeling it during January and February to rent it. He enjoys walking, fishing, house projects and volunteers at the [employer] and is the  for his Yazidism.     Social Determinants of Health     Financial Resource Strain: Not on file   Food Insecurity: Not on file   Transportation Needs: Not on file   Physical Activity: Not on file   Stress: Not on file   Social Connections: Not on file   Intimate Partner Violence: Not on file   Housing Stability: Not on file           Medications  Allergies   Current Outpatient Medications   Medication Sig Dispense Refill     apixaban ANTICOAGULANT (ELIQUIS ANTICOAGULANT) 5 MG tablet Take 1 tablet (5 mg) by mouth 2 times daily 180 tablet 4     atorvastatin (LIPITOR) 40 MG tablet Take 1 tablet (40 mg) by mouth daily 90 tablet 4     clopidogrel (PLAVIX) 75 MG tablet Take 1 tablet (75 mg) by mouth daily 90 tablet 4     digoxin (LANOXIN) 250 mcg tablet [DIGOXIN (LANOXIN) 250 MCG TABLET] Take 250 mcg by mouth daily.       esomeprazole (NEXIUM) 40 MG DR capsule Take 40 mg by mouth       metoprolol  succinate ER (TOPROL XL) 25 MG 24 hr tablet TAKE ONE-HALF TABLET BY MOUTH ONCE DAILY 90 tablet 3     MULTIVITAMIN (MULTIPLE VITAMINS ORAL) [MULTIVITAMIN (MULTIPLE VITAMINS ORAL)] Take 1 tablet by mouth daily.       nitroGLYcerin (NITROSTAT) 0.4 MG sublingual tablet For chest pain place 1 tablet under the tongue every 5 minutes for 3 doses. If symptoms persist 5 minutes after 1st dose call 911. 25 tablet 4     pramipexole (MIRAPEX) 0.75 MG tablet [PRAMIPEXOLE (MIRAPEX) 0.75 MG TABLET] Take 1/2 tablet daily       VIT C/VIT E/LUTEIN/MIN/OMEGA-3 (OCUVITE ORAL) [VIT C/VIT E/LUTEIN/MIN/OMEGA-3 (OCUVITE ORAL)] Take 1 tablet by mouth daily.       No Known Allergies       Lab Results    Chemistry/lipid CBC Cardiac Enzymes/BNP/TSH/INR   Recent Labs   Lab Test 10/13/22  1531   CHOL 118   HDL 53   LDL 43   TRIG 108     Recent Labs   Lab Test 10/13/22  1531 04/26/22  0940 10/06/21  0851   LDL 43 56 60     Recent Labs   Lab Test 12/13/22  0955 10/13/22  1531   NA  --  140   POTASSIUM  --  4.4   CHLORIDE  --  101   CO2  --  28   GLC  --  86   BUN  --  17.9   CR 1.0 0.94   GFRESTIMATED >60 83   ONIEL  --  9.2     Recent Labs   Lab Test 12/13/22  0955 10/13/22  1531 04/26/22  0940   CR 1.0 0.94 1.16     No results for input(s): A1C in the last 24241 hours.       No results for input(s): WBC, HGB, HCT, MCV, PLT in the last 79168 hours.  No results for input(s): HGB in the last 38864 hours. No results for input(s): TROPONINI in the last 33598 hours.  No results for input(s): BNP, NTBNPI, NTBNP in the last 67985 hours.  No results for input(s): TSH in the last 73281 hours.  No results for input(s): INR in the last 42990 hours.     Rashid Castaneda MD    Thank you for allowing me to participate in the care of your patient.      Sincerely,     Rashid Castaneda MD     [facility]  cc:   Rashid Castaneda MD  [address] [facility] [address]  [address]  [place] [address]

## 2023-01-04 ENCOUNTER — HOSPITAL ENCOUNTER (OUTPATIENT)
Dept: ULTRASOUND IMAGING | Facility: CLINIC | Age: 78
Discharge: HOME | End: 2023-01-04
Attending: INTERNAL MEDICINE | Admitting: INTERNAL MEDICINE
Payer: MEDICARE

## 2023-01-04 DIAGNOSIS — Z13.6 ENCOUNTER FOR SCREENING FOR ABDOMINAL AORTIC ANEURYSM (AAA) IN PATIENT 50 YEARS OF AGE OR OLDER WITHOUT OTHER RISK FACTORS FOR AAA: ICD-10-CM

## 2023-01-04 PROCEDURE — 76775 US EXAM ABDO BACK WALL LIM: CPT

## 2023-03-17 DIAGNOSIS — I48.0 PAROXYSMAL ATRIAL FIBRILLATION (H): ICD-10-CM

## 2023-03-20 RX ORDER — APIXABAN 5 MG/1
TABLET, FILM COATED ORAL
Qty: 180 TABLET | Refills: 0 | Status: SHIPPED | OUTPATIENT
Start: 2023-03-20 | End: 2023-06-12

## 2023-05-04 ENCOUNTER — TRANSFERRED RECORDS (OUTPATIENT)
Dept: HEALTH INFORMATION MANAGEMENT | Facility: CLINIC | Age: 78
End: 2023-05-04

## 2023-05-04 ENCOUNTER — LAB REQUISITION (OUTPATIENT)
Dept: LAB | Facility: CLINIC | Age: 78
End: 2023-05-04
Payer: MEDICARE

## 2023-05-04 DIAGNOSIS — Z85.46 PERSONAL HISTORY OF MALIGNANT NEOPLASM OF PROSTATE: ICD-10-CM

## 2023-05-04 DIAGNOSIS — E78.5 HYPERLIPIDEMIA, UNSPECIFIED: ICD-10-CM

## 2023-05-04 LAB
CHOLEST SERPL-MCNC: 129 MG/DL
HDLC SERPL-MCNC: 60 MG/DL
LDLC SERPL CALC-MCNC: 54 MG/DL
NONHDLC SERPL-MCNC: 69 MG/DL
PSA SERPL DL<=0.01 NG/ML-MCNC: 2.06 NG/ML (ref 0–6.5)
TRIGL SERPL-MCNC: 74 MG/DL

## 2023-05-04 PROCEDURE — 84153 ASSAY OF PSA TOTAL: CPT | Mod: ORL | Performed by: FAMILY MEDICINE

## 2023-05-04 PROCEDURE — 80061 LIPID PANEL: CPT | Mod: ORL | Performed by: FAMILY MEDICINE

## 2023-06-02 ENCOUNTER — HEALTH MAINTENANCE LETTER (OUTPATIENT)
Age: 78
End: 2023-06-02

## 2023-06-09 DIAGNOSIS — I48.0 PAROXYSMAL ATRIAL FIBRILLATION (H): ICD-10-CM

## 2023-06-12 RX ORDER — APIXABAN 5 MG/1
TABLET, FILM COATED ORAL
Qty: 180 TABLET | Refills: 1 | Status: SHIPPED | OUTPATIENT
Start: 2023-06-12 | End: 2024-01-08

## 2023-07-21 DIAGNOSIS — I25.83 CORONARY ARTERY DISEASE DUE TO LIPID RICH PLAQUE: ICD-10-CM

## 2023-07-21 DIAGNOSIS — I25.10 CORONARY ARTERY DISEASE DUE TO LIPID RICH PLAQUE: ICD-10-CM

## 2023-07-21 RX ORDER — CLOPIDOGREL BISULFATE 75 MG/1
TABLET ORAL
Qty: 90 TABLET | Refills: 1 | Status: SHIPPED | OUTPATIENT
Start: 2023-07-21 | End: 2024-02-12

## 2023-07-21 RX ORDER — ATORVASTATIN CALCIUM 40 MG/1
TABLET, FILM COATED ORAL
Qty: 90 TABLET | Refills: 1 | Status: SHIPPED | OUTPATIENT
Start: 2023-07-21 | End: 2024-02-12

## 2023-08-09 ENCOUNTER — OFFICE VISIT (OUTPATIENT)
Dept: CARDIOLOGY | Facility: CLINIC | Age: 78
End: 2023-08-09
Attending: INTERNAL MEDICINE
Payer: MEDICARE

## 2023-08-09 VITALS
DIASTOLIC BLOOD PRESSURE: 68 MMHG | HEIGHT: 73 IN | HEART RATE: 60 BPM | BODY MASS INDEX: 22.53 KG/M2 | SYSTOLIC BLOOD PRESSURE: 130 MMHG | WEIGHT: 170 LBS | RESPIRATION RATE: 16 BRPM

## 2023-08-09 DIAGNOSIS — I25.83 CORONARY ARTERY DISEASE DUE TO LIPID RICH PLAQUE: ICD-10-CM

## 2023-08-09 DIAGNOSIS — I25.10 CORONARY ARTERY DISEASE DUE TO LIPID RICH PLAQUE: ICD-10-CM

## 2023-08-09 DIAGNOSIS — I48.0 PAROXYSMAL ATRIAL FIBRILLATION (H): ICD-10-CM

## 2023-08-09 PROCEDURE — 99214 OFFICE O/P EST MOD 30 MIN: CPT | Performed by: INTERNAL MEDICINE

## 2023-08-09 NOTE — PROGRESS NOTES
HEART CARE ENCOUNTER CONSULTATON NOTE      [facility]  [phone]      Assessment/Recommendations   Assessment/Plan:  1.  Coronary artery disease with prior drug-eluting stent to the mid right coronary artery in August 2021.  He had been experiencing some atypical chest discomfort with an exercise stress echocardiogram that was negative for inducible ischemia by echocardiographic criteria but with EKG changes.  This was completed October 2022 and subsequently underwent coronary CT angiography December 2022 that revealed mild nonobstructive disease in the left anterior descending, mid to distal circumflex small sized vessel with potential moderate calcified stenosis.  Stent in the right coronary artery in the proximal to mid vessel was patent.  2.  History of paroxysmal atrial fibrillation.  Longstanding history of infrequent paroxysmal atrial fibrillation with a history of utilization of propafenone.  I indicated that we should avoid propafenone given his now documented coronary disease.  He reports that the atrial fibrillation is 2-3 times per year will keep me updated if the symptom becomes more pronounced.  3.  Dyslipidemia.  Most recent lipids are from October 2022 at which time total cholesterol was 118, LDL of 43.  4.  Abdominal aortic aneurysm screening.  Ultrasound from January 2023 demonstrated minimal ectatic distal abdominal aorta without aneurysm.  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    1.  Follow-up in 6 months  2.  I have asked that he have a digoxin level with his upcoming physical exam with Dr. Spear in the fall.  3.  Periodic follow-up of the right common iliac artery enlargement.  4.  Continue with current combination of medications.  We did talk about the higher risk of bleeding with Eliquis combined with Plavix.  We talked about the Watchman device.  I amoing to correspond with my interventional colleagues.     History of Present  Illness/Subjective    HPI: Ramesh Pate is a 78 year old male who is seen in follow up.He has a history of non-ST segment elevation myocardial infarction and stenting of RCA in August 2021.  He did not have any significant disease elsewhere.  He had normal stress echo in July 2022.  We last visited December 2022.    Recent Echocardiogram Results:    Echo Stress Echocardiogram: Result Notes     Chanell Gardner RN   10/25/2022 11:29 AM CDT       ----- Message -----  From: Rashid Castaneda MD  Sent: 10/25/2022  10:04 AM CDT  To: Chanell Gardner RN     I sent him a my chart note mdg        ==noted. -[facility]    Chanell Gardner RN   10/20/2022  9:55 AM CDT       Addendum: Written message from mindy also released to [employer] attached to result. -[facility]     Chanell Gardner RN   10/20/2022  9:52 AM CDT       Called Ramesh and updated on stable test results. Study was compared to a prior study in July and it was found to have no significant change. Ramesh will be leaving for [place] soon. He has a CT angio scheduled in December and then follow up with his primary cardiologist afterwards. He wonders if he needs to still have the CT angio. Will route to mdg to address this question. -[facility] Dr. Castaneda,  I spoke with Ramesh regarding his stable results and the review from Dr. Kapadia. He is getting ready for his [place] vacation and just wonders if he should still have his CCTA when he gets back. I told him to leave things in place for now as these tests can schedule pretty far out. Let me know if I can pass any other message along. He may send you a inexio message directly as well.  Thanks,  Mal     Wicho Kapadia MD   10/20/2022  8:58 AM CDT       No conclusive evidence of ischemia by stress echo, reassurance              Echo Stress Echocardiogram: Patient Communication     Append Comments   Seen    Wicho Kapadia MD   10/20/2022  8:58 AM CDT   No conclusive evidence of ischemia by stress echo, reassurance     Hi  Ramesh,   I am just releasing the message from Dr. Enriquez and attaching it to your results for documentation of physician comment/review. Please keep all your current appointments and testing scheduled in December until Dr. Castaneda reviews or you hear otherwise from his team. I did send him a message also inquiring if CT angio should still be done.  Thanks!  Chanell RN   Written by Chanell Gardner RN on 10/20/2022  9:55 AM CDT  Seen by patient Ramesh Pate on 10/21/2022  7:03 AM    Results  Echo Stress Echocardiogram (Order [id number])  External Result Report    External Result Report     PACS Images     Show images for Echo Stress Echocardiogram  Echo Stress Echocardiogram  Order: [id number]  Status: Edited Result - FINAL     Visible to patient: Yes (seen)     Next appt: 2023 at 09:20 AM in Cardiology (Rashid Castaneda MD)     Dx: Precordial pain     4 Result Notes    1 Patient Communication  Details    Reading Physician Reading Date Result Priority   Fausto Brandon MD  [phone] 10/19/2022      Narrative & Impression  [id number]  [id number]  [id number]  [id number]^[place] [address]     Name: RAMESH PATE  MRN: [id number]  : 1945  Study Date: 10/19/2022 10:47 AM  Age: 77 yrs  Gender: Male  Patient Location: [facility]  Reason For Study: Precordial pain  Ordering Physician: MATA ENRIQUEZ  Referring Physician: MATA ENRIQUEZ  Performed By: RAMONA     BSA: 2.0 m2  Height: 73 in  Weight: 171 lb  HR: 70  BP: 128/78 mmHg  ______________________________________________________________________________  Procedure  Stress Echo Complete. Adequate quality two-dimensional was performed and  interpreted. Adequate quality color and spectral Doppler were performed and  interpreted.  ______________________________________________________________________________  Interpretation Summary  Stress findings:  1. Normal stress echocardiogram with no evidence of stress-induced  ischemia.  Estimated post exercise left ventricular ejection fraction is greater than  70%.  2. Abnormal stress EKG with development of 2 mm horizontal ST depressions in  leads V4-V6, II, III, and aVF returning to baseline 6:00 minutes into the  recovery period.  3. Exercise capacity is above average for age and gender. The patient  exercised for 10:00 minutes on the Luis Manuel protocol, achieving 11.7 METs and 89%  the age-predicted maximium heart rate. The patient experienced non-limiting  chest pain during exercise.     Rest findings:  1. Left ventricular size, wall thickness, and systolic function are normal.  The estimated left ventricular ejection fraction is 60-65%.  2. Right ventricular size and systolic function are normal.  3. No hemodynamically significant valvular abnormalities.  4. Borderline dilatation of the aortic root measuring 4.2 cm.     Compared to the prior study dated 2022 there has been no significant  change.         Recent Coronary Angiogram Results:   CTA Angiogram coronary artery  Order# [id number]  Reading physician: Terrie Alonzo MD Ordering physician: Rashid Castaneda MD Study date: 2022     Patient Information    Patient Name   Ramesh Pate MRN   [id number] Legal Sex   Male              Age   1945 (78 year old)     Reason for Exam  Priority: Routine  Dx: Chest pain [R07.9 (ICD-10-CM)]     Indications    Chest pain [R07.9 (ICD-10-CM)]     Interpretation Summary    Right dominant coronary system.  Left main: Short left main with mild stenosis.  Left anterior descending artery: Mild nonobstructive stenosis identified in proximal to midportion.  Left circumflex artery: Mild calcified plaque in proximal first obtuse marginal branch which is large vessel. Mid to distal circumflex is a small sized vessel and there appears to be at least moderate calcified stenosis in this area.  Right coronary artery. Stent in the proximal to mid vessel that appears patent. Mild mainly  noncalcified plaque noted in distal right coronary artery.          Study Result    Narrative & Impression   EXAM: US ABDOMINAL AORTA  LOCATION: [facility]  DATE/TIME: 1/4/2023 10:09 AM     INDICATION:  Encounter for screening for abdominal aortic aneurysm (AAA) in patient 50 years of age or older without other risk factors for AAA  COMPARISON: None.  TECHNIQUE: Transverse and longitudinal images of the aorta. Color flow and spectral Doppler with waveform analysis.     FINDINGS: Minimally ectatic distal abdominal aorta, with additional fusiform ectasia of the distal right common iliac artery. No zita aneurysm. There is diffuse atheromatous plaque noted.     MEASUREMENTS:  Proximal Aorta: 2.1 x 2.1 cm.  Mid Aorta: 2.0 x 1.9 cm.  Distal Aorta: 2.2 x 2.3 cm.  Right Common Iliac Artery: 1.2 x 1.2 cm proximally, with fusiform dilation distally to a diameter of 1.7 cm.  Left Common Iliac Artery: 1.4 x 1.5 cm.     Limited Doppler evaluation demonstrates normal aortoiliac velocities.                                                                      IMPRESSION:     Minimally ectatic distal abdominal aorta with no evidence of abdominal aortic aneurysm.     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             Physical Examination  Review of Systems   Vitals: 130/68, weight 170 pounds, rate of 60 and regular  Wt Readings from Last 3 Encounters:   12/20/22 77.6 kg (171 lb)   12/13/22 77.1 kg (170 lb)   10/19/22 77.6 kg (171 lb)       General Appearance:   no distress, normal body habitus   ENT/Mouth: membranes moist,    EYES:  no scleral icterus, normal conjunctivae   Neck: no carotid bruits    Chest/Lungs:   lungs are clear to auscultation, no rales or wheezing,   equal chest wall expansion    Cardiovascular:   Regular. Normal first and second heart sounds with no murmurs, rubs, or gallops; the carotid, radial and posterior tibial pulses are  intact, Jugular venous pressure in normal limits, no edema bilaterally    Abdomen:  no  bruits, or tenderness; bowel sounds are present   Extremities: no cyanosis or clubbing   Skin: no xanthelasma, warm.    Neurologic:  no tremors     Psychiatric: alert and oriented x3, calm        Please refer above for cardiac ROS details.        Medical History  Surgical History Family History Social History   No past medical history on file.  Past Surgical History:   Procedure Laterality Date    APPENDECTOMY  1966    REVISE SECONDARY VARICOSITY      Description: Varicose Vein Ligation;  Recorded: 03/07/2013;     Family History   Problem Relation Age of Onset    Liver Cancer Mother     Lung Cancer Father     No Known Problems Sister     Parkinsonism Sister     No Known Problems Daughter     No Known Problems Daughter     No Known Problems Daughter         Social History     Socioeconomic History    Marital status:      Spouse name: Not on file    Number of children: 3    Years of education: Not on file    Highest education level: Not on file   Occupational History    Not on file   Tobacco Use    Smoking status: Never    Smokeless tobacco: Never   Vaping Use    Vaping Use: Never used   Substance and Sexual Activity    Alcohol use: No    Drug use: No    Sexual activity: Yes     Partners: Female     Birth control/protection: Post-menopausal   Other Topics Concern    Not on file   Social History Narrative    Bought a condo on the [address] [facility] and was remodeling it during January and February to rent it. He enjoys walking, fishing, house projects and volunteers at the [employer] and is the  for his [employer].     Social Determinants of Health     Financial Resource Strain: Not on file   Food Insecurity: Not on file   Transportation Needs: Not on file   Physical Activity: Not on file   Stress: Not on file   Social Connections: Not on file   Intimate Partner Violence: Not on file   Housing Stability: Not  on file           Medications  Allergies   Current Outpatient Medications   Medication Sig Dispense Refill    atorvastatin (LIPITOR) 40 MG tablet Take 1 tablet by mouth once daily 90 tablet 1    clopidogrel (PLAVIX) 75 MG tablet Take 1 tablet by mouth once daily 90 tablet 1    digoxin (LANOXIN) 250 mcg tablet [DIGOXIN (LANOXIN) 250 MCG TABLET] Take 250 mcg by mouth daily.      ELIQUIS ANTICOAGULANT 5 MG tablet Take 1 tablet by mouth twice daily 180 tablet 1    esomeprazole (NEXIUM) 40 MG DR capsule Take 40 mg by mouth daily      metoprolol succinate ER (TOPROL XL) 25 MG 24 hr tablet TAKE ONE-HALF TABLET BY MOUTH ONCE DAILY 90 tablet 3    MULTIVITAMIN (MULTIPLE VITAMINS ORAL) [MULTIVITAMIN (MULTIPLE VITAMINS ORAL)] Take 1 tablet by mouth daily.      nitroGLYcerin (NITROSTAT) 0.4 MG sublingual tablet For chest pain place 1 tablet under the tongue every 5 minutes for 3 doses. If symptoms persist 5 minutes after 1st dose call 911. 25 tablet 4    pramipexole (MIRAPEX) 0.75 MG tablet [PRAMIPEXOLE (MIRAPEX) 0.75 MG TABLET] Take 1/2 tablet daily      VIT C/VIT E/LUTEIN/MIN/OMEGA-3 (OCUVITE ORAL) [VIT C/VIT E/LUTEIN/MIN/OMEGA-3 (OCUVITE ORAL)] Take 1 tablet by mouth daily.       No Known Allergies       Lab Results    Chemistry/lipid CBC Cardiac Enzymes/BNP/TSH/INR   Recent Labs   Lab Test 05/04/23  1137   CHOL 129   HDL 60   LDL 54   TRIG 74     Recent Labs   Lab Test 05/04/23  1137 10/13/22  1531 04/26/22  0940   LDL 54 43 56     Recent Labs   Lab Test 12/13/22  0955 10/13/22  1531   NA  --  140   POTASSIUM  --  4.4   CHLORIDE  --  101   CO2  --  28   GLC  --  86   BUN  --  17.9   CR 1.0 0.94   GFRESTIMATED >60 83   ONIEL  --  9.2     Recent Labs   Lab Test 12/13/22  0955 10/13/22  1531 04/26/22  0940   CR 1.0 0.94 1.16     No results for input(s): A1C in the last 81374 hours.       No results for input(s): WBC, HGB, HCT, MCV, PLT in the last 93119 hours.  No results for input(s): HGB in the last 13673 hours. No results for  input(s): TROPONINI in the last 60047 hours.  No results for input(s): BNP, NTBNPI, NTBNP in the last 46605 hours.  No results for input(s): TSH in the last 48885 hours.  No results for input(s): INR in the last 10883 hours.     Rashid Castaneda MD

## 2023-08-09 NOTE — PATIENT INSTRUCTIONS
Please update me with the dose of the Propfanone on your bottle so I can send a refill to use sparingly.We talked about taking an extra full tablet of metoprolol at the onset of atrial fibrillation and using propafenone if still in atrial fibrillation and avoiding extra digoxin.Please have a Digoxin level checked and forwarded to me at the time of your physical.I will let you know the thoughts about the Plavix and the Watchman pending my discussion with my interventional colleague.Please call or write with any questions.

## 2023-08-09 NOTE — LETTER
8/9/2023    Luis Manuel Robertson MD  [address]  [facility] [address]    RE: Ramesh Pate       Dear Colleague,     I had the pleasure of seeing Ramesh VALLADARES Rio in the [facility].    HEART CARE ENCOUNTER CONSULTATON NOTE      DANE [facility]  [phone]      Assessment/Recommendations   Assessment/Plan:  1.  Coronary artery disease with prior drug-eluting stent to the mid right coronary artery in August 2021.  He had been experiencing some atypical chest discomfort with an exercise stress echocardiogram that was negative for inducible ischemia by echocardiographic criteria but with EKG changes.  This was completed October 2022 and subsequently underwent coronary CT angiography December 2022 that revealed mild nonobstructive disease in the left anterior descending, mid to distal circumflex small sized vessel with potential moderate calcified stenosis.  Stent in the right coronary artery in the proximal to mid vessel was patent.  2.  History of paroxysmal atrial fibrillation.  Longstanding history of infrequent paroxysmal atrial fibrillation with a history of utilization of propafenone.  I indicated that we should avoid propafenone given his now documented coronary disease.  He reports that the atrial fibrillation is 2-3 times per year will keep me updated if the symptom becomes more pronounced.  3.  Dyslipidemia.  Most recent lipids are from October 2022 at which time total cholesterol was 118, LDL of 43.  4.  Abdominal aortic aneurysm screening.  Ultrasound from January 2023 demonstrated minimal ectatic distal abdominal aorta without aneurysm.  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    1.  Follow-up in 6 months  2.  I have asked that he have a digoxin level with his upcoming physical exam with Dr. Spear in the fall.  3.  Periodic follow-up of the right common iliac artery enlargement.  4.  Continue with current combination of  medications.  We did talk about the higher risk of bleeding with Eliquis combined with Plavix.  We talked about the Watchman device.  I amoing to correspond with my interventional colleagues.     History of Present Illness/Subjective    HPI: Ramesh Pate is a 78 year old male who is seen in follow up.He has a history of non-ST segment elevation myocardial infarction and stenting of RCA in August 2021.  He did not have any significant disease elsewhere.  He had normal stress echo in July 2022.  We last visited December 2022.    Recent Echocardiogram Results:    Echo Stress Echocardiogram: Result Notes     Chanell Gardner RN   10/25/2022 11:29 AM CDT       ----- Message -----  From: Rashid Castaneda MD  Sent: 10/25/2022  10:04 AM CDT  To: Chanell Gardner RN     I sent him a my chart note mdg        ==noted. -[facility]    Chanell Gardner RN   10/20/2022  9:55 AM CDT       Addendum: Written message from [facility] also released to [employer] attached to result. -[facility]     Chanell Gardner RN   10/20/2022  9:52 AM CDT       Called Ramesh and updated on stable test results. Study was compared to a prior study in July and it was found to have no significant change. Ramesh will be leaving for [place] soon. He has a CT angio scheduled in December and then follow up with his primary cardiologist afterwards. He wonders if he needs to still have the CT angio. Will route to mdg to address this question. -[facility] Dr. Castaneda,  I spoke with Ramesh regarding his stable results and the review from Dr. Kapadia. He is getting ready for his [place] vacation and just wonders if he should still have his CCTA when he gets back. I told him to leave things in place for now as these tests can schedule pretty far out. Let me know if I can pass any other message along. He may send you a [employer] message directly as well.  Thanks,  Jonas Kapadia MD   10/20/2022  8:58 AM CDT       No conclusive evidence of ischemia by stress echo,  reassurance              Echo Stress Echocardiogram: Patient Communication     Append Comments   Seen    Mata Enriquez MD   10/20/2022  8:58 AM CDT   No conclusive evidence of ischemia by stress echo, reassaniceto Chandler,   I am just releasing the message from Dr. Enriquez and attaching it to your results for documentation of physician comment/review. Please keep all your current appointments and testing scheduled in December until Dr. Castaneda reviews or you hear otherwise from his team. I did send him a message also inquiring if CT angio should still be done.  Thanks!  Chanell RN   Written by Chanell Gardner RN on 10/20/2022  9:55 AM CDT  Seen by patient Ramesh Pate on 10/21/2022  7:03 AM    Results  Echo Stress Echocardiogram (Order [id number])  External Result Report    External Result Report     PACS Images     Show images for Echo Stress Echocardiogram  Echo Stress Echocardiogram  Order: [id number]  Status: Edited Result - FINAL     Visible to patient: Yes (seen)     Next appt: 2023 at 09:20 AM in Cardiology (Rashid Castaneda MD)     Dx: Precordial pain     4 Result Notes    1 Patient Communication  Details    Reading Physician Reading Date Result Priority   Fausto Brandon MD  [phone] 10/19/2022      Narrative & Impression  [id number]  [id number]  [id number]  [id number]^[place] [address]     Name: RAMESH PATE  MRN: [id number]  : 1945  Study Date: 10/19/2022 10:47 AM  Age: 77 yrs  Gender: Male  Patient Location: [facility]  Reason For Study: Precordial pain  Ordering Physician: MATA ENRIQUEZ  Referring Physician: MATA ENRIQUEZ  Performed By: RAMONA     BSA: 2.0 m2  Height: 73 in  Weight: 171 lb  HR: 70  BP: 128/78 mmHg  ______________________________________________________________________________  Procedure  Stress Echo Complete. Adequate quality two-dimensional was performed and  interpreted. Adequate quality color and spectral Doppler  were performed and  interpreted.  ______________________________________________________________________________  Interpretation Summary  Stress findings:  1. Normal stress echocardiogram with no evidence of stress-induced ischemia.  Estimated post exercise left ventricular ejection fraction is greater than  70%.  2. Abnormal stress EKG with development of 2 mm horizontal ST depressions in  leads V4-V6, II, III, and aVF returning to baseline 6:00 minutes into the  recovery period.  3. Exercise capacity is above average for age and gender. The patient  exercised for 10:00 minutes on the Luis Manuel protocol, achieving 11.7 METs and 89%  the age-predicted maximium heart rate. The patient experienced non-limiting  chest pain during exercise.     Rest findings:  1. Left ventricular size, wall thickness, and systolic function are normal.  The estimated left ventricular ejection fraction is 60-65%.  2. Right ventricular size and systolic function are normal.  3. No hemodynamically significant valvular abnormalities.  4. Borderline dilatation of the aortic root measuring 4.2 cm.     Compared to the prior study dated 2022 there has been no significant  change.         Recent Coronary Angiogram Results:   CTA Angiogram coronary artery  Order# [id number]  Reading physician: Terrie Alonzo MD Ordering physician: Rashid Castaneda MD Study date: 2022     Patient Information    Patient Name   Ramesh Pate MRN   [id number] Legal Sex   Male              Age   1945 (78 year old)     Reason for Exam  Priority: Routine  Dx: Chest pain [R07.9 (ICD-10-CM)]     Indications    Chest pain [R07.9 (ICD-10-CM)]     Interpretation Summary    Right dominant coronary system.  Left main: Short left main with mild stenosis.  Left anterior descending artery: Mild nonobstructive stenosis identified in proximal to midportion.  Left circumflex artery: Mild calcified plaque in proximal first obtuse marginal branch which is large  vessel. Mid to distal circumflex is a small sized vessel and there appears to be at least moderate calcified stenosis in this area.  Right coronary artery. Stent in the proximal to mid vessel that appears patent. Mild mainly noncalcified plaque noted in distal right coronary artery.          Study Result    Narrative & Impression   EXAM: US ABDOMINAL AORTA  LOCATION: [facility]  DATE/TIME: 1/4/2023 10:09 AM     INDICATION:  Encounter for screening for abdominal aortic aneurysm (AAA) in patient 50 years of age or older without other risk factors for AAA  COMPARISON: None.  TECHNIQUE: Transverse and longitudinal images of the aorta. Color flow and spectral Doppler with waveform analysis.     FINDINGS: Minimally ectatic distal abdominal aorta, with additional fusiform ectasia of the distal right common iliac artery. No zita aneurysm. There is diffuse atheromatous plaque noted.     MEASUREMENTS:  Proximal Aorta: 2.1 x 2.1 cm.  Mid Aorta: 2.0 x 1.9 cm.  Distal Aorta: 2.2 x 2.3 cm.  Right Common Iliac Artery: 1.2 x 1.2 cm proximally, with fusiform dilation distally to a diameter of 1.7 cm.  Left Common Iliac Artery: 1.4 x 1.5 cm.     Limited Doppler evaluation demonstrates normal aortoiliac velocities.                                                                      IMPRESSION:     Minimally ectatic distal abdominal aorta with no evidence of abdominal aortic aneurysm.     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             Physical Examination  Review of Systems   Vitals: 130/68, weight 170 pounds, rate of 60 and regular  Wt Readings from Last 3 Encounters:   12/20/22 77.6 kg (171 lb)   12/13/22 77.1 kg (170 lb)   10/19/22 77.6 kg (171 lb)       General Appearance:   no distress, normal body habitus   ENT/Mouth: membranes moist,    EYES:  no scleral icterus, normal conjunctivae   Neck: no carotid bruits    Chest/Lungs:   lungs are  clear to auscultation, no rales or wheezing,   equal chest wall expansion    Cardiovascular:   Regular. Normal first and second heart sounds with no murmurs, rubs, or gallops; the carotid, radial and posterior tibial pulses are intact, Jugular venous pressure in normal limits, no edema bilaterally    Abdomen:  no  bruits, or tenderness; bowel sounds are present   Extremities: no cyanosis or clubbing   Skin: no xanthelasma, warm.    Neurologic:  no tremors     Psychiatric: alert and oriented x3, calm        Please refer above for cardiac ROS details.        Medical History  Surgical History Family History Social History   No past medical history on file.  Past Surgical History:   Procedure Laterality Date    APPENDECTOMY  1966    REVISE SECONDARY VARICOSITY      Description: Varicose Vein Ligation;  Recorded: 03/07/2013;     Family History   Problem Relation Age of Onset    Liver Cancer Mother     Lung Cancer Father     No Known Problems Sister     Parkinsonism Sister     No Known Problems Daughter     No Known Problems Daughter     No Known Problems Daughter         Social History     Socioeconomic History    Marital status:      Spouse name: Not on file    Number of children: 3    Years of education: Not on file    Highest education level: Not on file   Occupational History    Not on file   Tobacco Use    Smoking status: Never    Smokeless tobacco: Never   Vaping Use    Vaping Use: Never used   Substance and Sexual Activity    Alcohol use: No    Drug use: No    Sexual activity: Yes     Partners: Female     Birth control/protection: Post-menopausal   Other Topics Concern    Not on file   Social History Narrative    Bought a condo on the [address] [address] and was remodeling it during January and February to rent it. He enjoys walking, fishing, house projects and volunteers at the [employer] and is the  for his [employer]ism.     Social Determinants of Health     Financial Resource Strain: Not on  file   Food Insecurity: Not on file   Transportation Needs: Not on file   Physical Activity: Not on file   Stress: Not on file   Social Connections: Not on file   Intimate Partner Violence: Not on file   Housing Stability: Not on file           Medications  Allergies   Current Outpatient Medications   Medication Sig Dispense Refill    atorvastatin (LIPITOR) 40 MG tablet Take 1 tablet by mouth once daily 90 tablet 1    clopidogrel (PLAVIX) 75 MG tablet Take 1 tablet by mouth once daily 90 tablet 1    digoxin (LANOXIN) 250 mcg tablet [DIGOXIN (LANOXIN) 250 MCG TABLET] Take 250 mcg by mouth daily.      ELIQUIS ANTICOAGULANT 5 MG tablet Take 1 tablet by mouth twice daily 180 tablet 1    esomeprazole (NEXIUM) 40 MG DR capsule Take 40 mg by mouth daily      metoprolol succinate ER (TOPROL XL) 25 MG 24 hr tablet TAKE ONE-HALF TABLET BY MOUTH ONCE DAILY 90 tablet 3    MULTIVITAMIN (MULTIPLE VITAMINS ORAL) [MULTIVITAMIN (MULTIPLE VITAMINS ORAL)] Take 1 tablet by mouth daily.      nitroGLYcerin (NITROSTAT) 0.4 MG sublingual tablet For chest pain place 1 tablet under the tongue every 5 minutes for 3 doses. If symptoms persist 5 minutes after 1st dose call 911. 25 tablet 4    pramipexole (MIRAPEX) 0.75 MG tablet [PRAMIPEXOLE (MIRAPEX) 0.75 MG TABLET] Take 1/2 tablet daily      VIT C/VIT E/LUTEIN/MIN/OMEGA-3 (OCUVITE ORAL) [VIT C/VIT E/LUTEIN/MIN/OMEGA-3 (OCUVITE ORAL)] Take 1 tablet by mouth daily.       No Known Allergies       Lab Results    Chemistry/lipid CBC Cardiac Enzymes/BNP/TSH/INR   Recent Labs   Lab Test 05/04/23  1137   CHOL 129   HDL 60   LDL 54   TRIG 74     Recent Labs   Lab Test 05/04/23  1137 10/13/22  1531 04/26/22  0940   LDL 54 43 56     Recent Labs   Lab Test 12/13/22  0955 10/13/22  1531   NA  --  140   POTASSIUM  --  4.4   CHLORIDE  --  101   CO2  --  28   GLC  --  86   BUN  --  17.9   CR 1.0 0.94   GFRESTIMATED >60 83   ONIEL  --  9.2     Recent Labs   Lab Test 12/13/22  0955 10/13/22  1531 04/26/22  0940    CR 1.0 0.94 1.16     No results for input(s): A1C in the last 10699 hours.       No results for input(s): WBC, HGB, HCT, MCV, PLT in the last 81041 hours.  No results for input(s): HGB in the last 09123 hours. No results for input(s): TROPONINI in the last 92350 hours.  No results for input(s): BNP, NTBNPI, NTBNP in the last 54776 hours.  No results for input(s): TSH in the last 45735 hours.  No results for input(s): INR in the last 89473 hours.     Rashid Castaneda MD      Thank you for allowing me to participate in the care of your patient.      Sincerely,     Rashid Castaneda MD     [facility]  cc:   Rashid Castaneda MD  [address]  [place] [address]

## 2023-08-14 DIAGNOSIS — I48.0 PAROXYSMAL ATRIAL FIBRILLATION (H): Primary | Chronic | ICD-10-CM

## 2023-08-14 RX ORDER — PROPAFENONE HYDROCHLORIDE 150 MG/1
150 TABLET, COATED ORAL PRN
Qty: 10 TABLET | Refills: 0 | Status: SHIPPED | OUTPATIENT
Start: 2023-08-14 | End: 2024-04-24

## 2023-08-14 NOTE — PROGRESS NOTES
Propafenone 150 mg prn for a-fib lasting longer than 60 min, disp 10, no refills, sent to [facility].  -rah    ===View-only below this line===  ----- Message -----  From: Rashid Castaneda MD  Sent: 8/13/2023   7:55 PM CDT  To: Millicent Shen RN    Can we send a prescription for propafenone 150 mg prn to take with atrial fibrillation lasting more than 60 minutes, number 10, no refills. DANE Castaneda

## 2023-11-22 DIAGNOSIS — I25.83 CORONARY ARTERY DISEASE DUE TO LIPID RICH PLAQUE: ICD-10-CM

## 2023-11-22 DIAGNOSIS — I48.0 PAROXYSMAL ATRIAL FIBRILLATION (H): Primary | ICD-10-CM

## 2023-11-22 DIAGNOSIS — I25.10 CORONARY ARTERY DISEASE DUE TO LIPID RICH PLAQUE: ICD-10-CM

## 2023-11-24 RX ORDER — METOPROLOL SUCCINATE 25 MG/1
12.5 TABLET, EXTENDED RELEASE ORAL DAILY
Qty: 45 TABLET | Refills: 2 | Status: SHIPPED | OUTPATIENT
Start: 2023-11-24 | End: 2024-01-05

## 2023-12-07 ENCOUNTER — TRANSFERRED RECORDS (OUTPATIENT)
Dept: HEALTH INFORMATION MANAGEMENT | Facility: CLINIC | Age: 78
End: 2023-12-07

## 2023-12-07 ENCOUNTER — LAB REQUISITION (OUTPATIENT)
Dept: LAB | Facility: CLINIC | Age: 78
End: 2023-12-07
Payer: MEDICARE

## 2023-12-07 DIAGNOSIS — E78.5 HYPERLIPIDEMIA, UNSPECIFIED: ICD-10-CM

## 2023-12-07 DIAGNOSIS — I48.91 UNSPECIFIED ATRIAL FIBRILLATION (H): ICD-10-CM

## 2023-12-07 LAB
CHOLEST SERPL-MCNC: 129 MG/DL
DIGOXIN SERPL-MCNC: 0.7 NG/ML (ref 0.6–2)
FASTING STATUS PATIENT QL REPORTED: NORMAL
HDLC SERPL-MCNC: 61 MG/DL
LDLC SERPL CALC-MCNC: 56 MG/DL
NONHDLC SERPL-MCNC: 68 MG/DL
TRIGL SERPL-MCNC: 59 MG/DL

## 2023-12-07 PROCEDURE — 80162 ASSAY OF DIGOXIN TOTAL: CPT | Mod: ORL | Performed by: FAMILY MEDICINE

## 2023-12-07 PROCEDURE — 80061 LIPID PANEL: CPT | Mod: ORL | Performed by: FAMILY MEDICINE

## 2024-01-06 DIAGNOSIS — I48.0 PAROXYSMAL ATRIAL FIBRILLATION (H): ICD-10-CM

## 2024-01-08 RX ORDER — APIXABAN 5 MG/1
TABLET, FILM COATED ORAL
Qty: 180 TABLET | Refills: 0 | Status: SHIPPED | OUTPATIENT
Start: 2024-01-08 | End: 2024-04-03

## 2024-02-10 DIAGNOSIS — I25.10 CORONARY ARTERY DISEASE DUE TO LIPID RICH PLAQUE: ICD-10-CM

## 2024-02-10 DIAGNOSIS — I25.83 CORONARY ARTERY DISEASE DUE TO LIPID RICH PLAQUE: ICD-10-CM

## 2024-02-12 RX ORDER — ATORVASTATIN CALCIUM 40 MG/1
TABLET, FILM COATED ORAL
Qty: 90 TABLET | Refills: 0 | Status: SHIPPED | OUTPATIENT
Start: 2024-02-12 | End: 2024-04-24

## 2024-02-12 RX ORDER — CLOPIDOGREL BISULFATE 75 MG/1
TABLET ORAL
Qty: 90 TABLET | Refills: 0 | Status: SHIPPED | OUTPATIENT
Start: 2024-02-12 | End: 2024-04-24

## 2024-04-03 DIAGNOSIS — I48.0 PAROXYSMAL ATRIAL FIBRILLATION (H): ICD-10-CM

## 2024-04-03 DIAGNOSIS — I25.10 CORONARY ARTERY DISEASE DUE TO LIPID RICH PLAQUE: ICD-10-CM

## 2024-04-03 DIAGNOSIS — I25.83 CORONARY ARTERY DISEASE DUE TO LIPID RICH PLAQUE: ICD-10-CM

## 2024-04-03 RX ORDER — METOPROLOL SUCCINATE 25 MG/1
25 TABLET, EXTENDED RELEASE ORAL DAILY
Qty: 14 TABLET | Refills: 0 | Status: SHIPPED | OUTPATIENT
Start: 2024-04-03 | End: 2024-04-24

## 2024-04-03 RX ORDER — APIXABAN 5 MG/1
TABLET, FILM COATED ORAL
Qty: 28 TABLET | Refills: 0 | Status: SHIPPED | OUTPATIENT
Start: 2024-04-03 | End: 2024-04-24

## 2024-04-03 NOTE — TELEPHONE ENCOUNTER
Pt last seen 8/09/23 - rec 6 mo follow-up.  Pt is scheduled with [facility]ONEY [facility]low 4/17/24.  14 day supply sent.  -ral

## 2024-04-24 ENCOUNTER — OFFICE VISIT (OUTPATIENT)
Dept: CARDIOLOGY | Facility: CLINIC | Age: 79
End: 2024-04-24
Payer: MEDICARE

## 2024-04-24 VITALS
RESPIRATION RATE: 14 BRPM | HEART RATE: 73 BPM | BODY MASS INDEX: 23.35 KG/M2 | DIASTOLIC BLOOD PRESSURE: 64 MMHG | WEIGHT: 177 LBS | OXYGEN SATURATION: 96 % | SYSTOLIC BLOOD PRESSURE: 134 MMHG

## 2024-04-24 DIAGNOSIS — I25.10 CORONARY ARTERY DISEASE DUE TO LIPID RICH PLAQUE: ICD-10-CM

## 2024-04-24 DIAGNOSIS — I25.83 CORONARY ARTERY DISEASE DUE TO LIPID RICH PLAQUE: ICD-10-CM

## 2024-04-24 DIAGNOSIS — E78.5 DYSLIPIDEMIA, GOAL LDL BELOW 70: Chronic | ICD-10-CM

## 2024-04-24 DIAGNOSIS — I48.0 PAROXYSMAL ATRIAL FIBRILLATION (H): Primary | ICD-10-CM

## 2024-04-24 PROCEDURE — 99214 OFFICE O/P EST MOD 30 MIN: CPT | Performed by: INTERNAL MEDICINE

## 2024-04-24 RX ORDER — METOPROLOL SUCCINATE 25 MG/1
25 TABLET, EXTENDED RELEASE ORAL DAILY
Qty: 90 TABLET | Refills: 3 | Status: SHIPPED | OUTPATIENT
Start: 2024-04-24

## 2024-04-24 RX ORDER — DIGOXIN 250 MCG
250 TABLET ORAL DAILY
Qty: 90 TABLET | Refills: 3 | Status: SHIPPED | OUTPATIENT
Start: 2024-04-24

## 2024-04-24 RX ORDER — PROPAFENONE HYDROCHLORIDE 150 MG/1
150 TABLET, COATED ORAL PRN
Qty: 10 TABLET | Refills: 11 | Status: SHIPPED | OUTPATIENT
Start: 2024-04-24

## 2024-04-24 RX ORDER — ATORVASTATIN CALCIUM 40 MG/1
40 TABLET, FILM COATED ORAL DAILY
Qty: 90 TABLET | Refills: 3 | Status: SHIPPED | OUTPATIENT
Start: 2024-04-24

## 2024-04-24 RX ORDER — CLOPIDOGREL BISULFATE 75 MG/1
75 TABLET ORAL DAILY
Qty: 90 TABLET | Refills: 3 | Status: SHIPPED | OUTPATIENT
Start: 2024-04-24

## 2024-04-24 NOTE — PROGRESS NOTES
Click to link to [facility] HEART CARE NOTE       Assessment/Plan:   Coronary artery disease status post stent placement taking CAD: The patient had no chest pain or shortness of breath.  His coronary CT angiogram in December 2022 was reported to patent the stent, no obstructive lesion.  Continue current medical treatment.  Paroxysmal atrial fibrillation: He had atrial fibrillation 2-4 times a year.  He clearly knew his episode of atrial fibrillation each time, lasted for few hours.  His atrial fibrillation can be converted with propafenone as needed 1 to 2 tablets each time.  Currently he is taking digoxin 0.25 mg daily.  Recent digoxin level was 0.7.  He is on metoprolol ER 25 mg daily. His [id number] score is 3 due to CAD, age.  Continue Eliquis 5 mg twice a day.  I did mention that usually we do not use propafenone for management of atrial fibrillation if somebody who has coronary artery disease.  He does not take a propafenone as a daily basis, just use that 2-4 times a year when he had atrial fibrillation.  He tolerated to it well, no side effects so far.  Dyslipidemia: Continue atorvastatin 40 mg at at bedtime.  LDL was well-controlled.  Mild right iliac artery dilation: Stable.  Continue to follow-up.    Thank you for the opportunity to be involved in the care of Ramesh Pate. If you have any questions, please feel free to contact me.  I will see the patient again in 12 months and as needed    Much or all of the text in this note was generated through the use of Dragon Dictate voice-to-text software. Errors in spelling or words which seem out of context are unintentional.   Sound alike errors, in particular, may have escaped editing.       History of Present Illness:   It is my pleasure to see Ramesh Pate at the [facility] clinic for evaluation of Follow Up. Ramesh Pate is a 79 year old male with a medical history of coronary artery disease  status post stent placement in RCA, paroxysmal atrial fibrillation, dyslipidemia, Mild right iliac artery dilation, gastric reflux disease.    The patient followed up with Dr. Castaneda who was retired.  The patient is under my care at this time.  He states that he has been doing quite well over the last 6 months.  He had no chest pain, shortness of breath.  He had 2-4 times of atrial fibrillation a year, each time lasted several hours, converted by propafenone as needed.  He had no orthopnea, PND or leg edema.  He has no dizziness, syncope.  His blood pressure and heart rate are controlled.    Past Medical History:     Patient Active Problem List   Diagnosis    Dyslipidemia, goal LDL below 70    Esophageal reflux    Paroxysmal Atrial Fibrillation    Nonocclusive coronary atherosclerosis of native coronary artery    Precordial pain       Past Surgical History:     Past Surgical History:   Procedure Laterality Date    APPENDECTOMY  1966    REVISE SECONDARY VARICOSITY      Description: Varicose Vein Ligation;  Recorded: 03/07/2013;       Family History:     Family History   Problem Relation Age of Onset    Liver Cancer Mother     Lung Cancer Father     No Known Problems Sister     Parkinsonism Sister     No Known Problems Daughter     No Known Problems Daughter     No Known Problems Daughter        Social History:    reports that he has never smoked. He has never used smokeless tobacco. He reports that he does not drink alcohol and does not use drugs.    Review of Systems:   12 systems are reviewed negative except for in HPI.    Meds:     Current Outpatient Medications:     apixaban ANTICOAGULANT (ELIQUIS ANTICOAGULANT) 5 MG tablet, Take 1 tablet (5 mg) by mouth 2 times daily, Disp: 180 tablet, Rfl: 3    atorvastatin (LIPITOR) 40 MG tablet, Take 1 tablet (40 mg) by mouth daily, Disp: 90 tablet, Rfl: 3    clopidogrel (PLAVIX) 75 MG tablet, Take 1 tablet (75 mg) by mouth daily, Disp: 90 tablet, Rfl: 3    digoxin (LANOXIN)  250 MCG tablet, Take 1 tablet (250 mcg) by mouth daily, Disp: 90 tablet, Rfl: 3    esomeprazole (NEXIUM) 40 MG DR capsule, Take 40 mg by mouth daily, Disp: , Rfl:     Ferrous Sulfate (IRON PO), Take 1 tablet by mouth daily, Disp: , Rfl:     metoprolol succinate ER (TOPROL XL) 25 MG 24 hr tablet, Take 1 tablet (25 mg) by mouth daily, Disp: 90 tablet, Rfl: 3    MULTIVITAMIN (MULTIPLE VITAMINS ORAL), [MULTIVITAMIN (MULTIPLE VITAMINS ORAL)] Take 1 tablet by mouth daily., Disp: , Rfl:     nitroGLYcerin (NITROSTAT) 0.4 MG sublingual tablet, For chest pain place 1 tablet under the tongue every 5 minutes for 3 doses. If symptoms persist 5 minutes after 1st dose call 911., Disp: 25 tablet, Rfl: 4    pramipexole (MIRAPEX) 0.75 MG tablet, [PRAMIPEXOLE (MIRAPEX) 0.75 MG TABLET] Take 1/2 tablet daily, Disp: , Rfl:     propafenone (RYTHMOL) 150 MG TABS tablet, Take 1 tablet (150 mg) by mouth as needed (for a-fib episode lasting longer than 60 minutes), Disp: 10 tablet, Rfl: 11    UNABLE TO FIND, Take 8 drops by mouth daily MEDICATION NAME: Magnesium drops in an 8 oz glass of water, Disp: , Rfl:     VIT C/VIT E/LUTEIN/MIN/OMEGA-3 (OCUVITE ORAL), [VIT C/VIT E/LUTEIN/MIN/OMEGA-3 (OCUVITE ORAL)] Take 1 tablet by mouth daily., Disp: , Rfl:      Allergies:   Patient has no known allergies.    Objective:      Physical Exam  80.3 kg (177 lb)     Body mass index is 23.35 kg/m .  /64 (BP Location: Right arm, Patient Position: Sitting, Cuff Size: Adult Regular)   Pulse 73   Resp 14   Wt 80.3 kg (177 lb)   SpO2 96%   BMI 23.35 kg/m      General Appearance:   Awake, Alert, No acute distress.   HEENT:  Pupil equal, reactive to light. No scleral icterus; the mucous membranes were moist. No oral ulcers or thrush.    Neck: No cervical bruits. No JVD. No thyromegaly. No lymph node enlargement or tenderness.   Chest: The spine was straight. The chest was symmetric.   Lungs:   Respirations unlabored. Lungs are clear to auscultation. No  crackles. No wheezing.   Cardiovascular:   RRR, normal first and second heart sounds with no murmurs. No rubs or gallops.    Abdomen:  Soft. No tenderness. Non-distended. Bowels sounds are present   Extremities: Equal posterior tibial pulses. No leg edema.   Skin: No rashes or ulcers. Warm, Dry.   Musculoskeletal: No tenderness. No deformity.   Neurologic: Mood and affect are appropriate. No focal deficits.         EKG:  Personally reivewed  Sinus rhythm with 1st degree A-V block   Otherwise normal ECG   When compared with ECG of 05-JAN-2004 11:23,   CA interval has increased     Cardiac Imaging Studies  Exercise stress echo October 19, 2022:  Stress findings:  1. Normal stress echocardiogram with no evidence of stress-induced ischemia.  Estimated post exercise left ventricular ejection fraction is greater than  70%.  2. Abnormal stress EKG with development of 2 mm horizontal ST depressions in  leads V4-V6, II, III, and aVF returning to baseline 6:00 minutes into the  recovery period.  3. Exercise capacity is above average for age and gender. The patient  exercised for 10:00 minutes on the Luis Manuel protocol, achieving 11.7 METs and 89%  the age-predicted maximium heart rate. The patient experienced non-limiting  chest pain during exercise.     Rest findings:  1. Left ventricular size, wall thickness, and systolic function are normal.  The estimated left ventricular ejection fraction is 60-65%.  2. Right ventricular size and systolic function are normal.  3. No hemodynamically significant valvular abnormalities.  4. Borderline dilatation of the aortic root measuring 4.2 cm.    Coronary CT angiogram on December 13, 2022:  Right dominant coronary system.  Left main: Short left main with mild stenosis.  Left anterior descending artery: Mild nonobstructive stenosis identified in proximal to midportion.  Left circumflex artery: Mild calcified plaque in proximal first obtuse marginal branch which is large vessel. Mid to  "distal circumflex is a small sized vessel and there appears to be at least moderate calcified stenosis in this area.  Right coronary artery. Stent in the proximal to mid vessel that appears patent. Mild mainly noncalcified plaque noted in distal right coronary artery.    Lab Review   Lab Results   Component Value Date     10/13/2022    CO2 28 10/13/2022    CO2 27 04/26/2022    BUN 17.9 10/13/2022    BUN 19 04/26/2022     No results found for: \"WBC\", \"HGB\", \"HCT\", \"MCV\", \"PLT\"  Lab Results   Component Value Date    CHOL 129 12/07/2023    TRIG 59 12/07/2023    HDL 61 12/07/2023    LDL 56 12/07/2023     LDL Cholesterol Calculated   Date Value Ref Range Status   12/07/2023 56 <=100 mg/dL Final               "

## 2024-04-24 NOTE — LETTER
4/24/2024    Luis Manuel Robertson MD  [address] [address]    RE: Ramesh Pate       Dear Colleague,     I had the pleasure of seeing Ramesh Pate in the [facility].      Click to link to [facility] HEART CARE NOTE       Assessment/Plan:   Coronary artery disease status post stent placement taking CAD: The patient had no chest pain or shortness of breath.  His coronary CT angiogram in December 2022 was reported to patent the stent, no obstructive lesion.  Continue current medical treatment.  Paroxysmal atrial fibrillation: He had atrial fibrillation 2-4 times a year.  He clearly knew his episode of atrial fibrillation each time, lasted for few hours.  His atrial fibrillation can be converted with propafenone as needed 1 to 2 tablets each time.  Currently he is taking digoxin 0.25 mg daily.  Recent digoxin level was 0.7.  He is on metoprolol ER 25 mg daily. His [id number] score is 3 due to CAD, age.  Continue Eliquis 5 mg twice a day.  I did mention that usually we do not use propafenone for management of atrial fibrillation if somebody who has coronary artery disease.  He does not take a propafenone as a daily basis, just use that 2-4 times a year when he had atrial fibrillation.  He tolerated to it well, no side effects so far.  Dyslipidemia: Continue atorvastatin 40 mg at at bedtime.  LDL was well-controlled.  Mild right iliac artery dilation: Stable.  Continue to follow-up.    Thank you for the opportunity to be involved in the care of Ramesh Pate. If you have any questions, please feel free to contact me.  I will see the patient again in 12 months and as needed    Much or all of the text in this note was generated through the use of Dragon Dictate voice-to-text software. Errors in spelling or words which seem out of context are unintentional.   Sound alike errors, in particular, may have escaped editing.       History of Present Illness:    It is my pleasure to see Ramesh Pate at the [facility] clinic for evaluation of Follow Up. Ramesh Pate is a 79 year old male with a medical history of coronary artery disease status post stent placement in RCA, paroxysmal atrial fibrillation, dyslipidemia, Mild right iliac artery dilation, gastric reflux disease.    The patient followed up with Dr. Castaneda who was retired.  The patient is under my care at this time.  He states that he has been doing quite well over the last 6 months.  He had no chest pain, shortness of breath.  He had 2-4 times of atrial fibrillation a year, each time lasted several hours, converted by propafenone as needed.  He had no orthopnea, PND or leg edema.  He has no dizziness, syncope.  His blood pressure and heart rate are controlled.    Past Medical History:     Patient Active Problem List   Diagnosis    Dyslipidemia, goal LDL below 70    Esophageal reflux    Paroxysmal Atrial Fibrillation    Nonocclusive coronary atherosclerosis of native coronary artery    Precordial pain       Past Surgical History:     Past Surgical History:   Procedure Laterality Date    APPENDECTOMY  1966    REVISE SECONDARY VARICOSITY      Description: Varicose Vein Ligation;  Recorded: 03/07/2013;       Family History:     Family History   Problem Relation Age of Onset    Liver Cancer Mother     Lung Cancer Father     No Known Problems Sister     Parkinsonism Sister     No Known Problems Daughter     No Known Problems Daughter     No Known Problems Daughter        Social History:    reports that he has never smoked. He has never used smokeless tobacco. He reports that he does not drink alcohol and does not use drugs.    Review of Systems:   12 systems are reviewed negative except for in HPI.    Meds:     Current Outpatient Medications:     apixaban ANTICOAGULANT (ELIQUIS ANTICOAGULANT) 5 MG tablet, Take 1 tablet (5 mg) by mouth 2 times daily, Disp: 180 tablet, Rfl: 3    atorvastatin  (LIPITOR) 40 MG tablet, Take 1 tablet (40 mg) by mouth daily, Disp: 90 tablet, Rfl: 3    clopidogrel (PLAVIX) 75 MG tablet, Take 1 tablet (75 mg) by mouth daily, Disp: 90 tablet, Rfl: 3    digoxin (LANOXIN) 250 MCG tablet, Take 1 tablet (250 mcg) by mouth daily, Disp: 90 tablet, Rfl: 3    esomeprazole (NEXIUM) 40 MG DR capsule, Take 40 mg by mouth daily, Disp: , Rfl:     Ferrous Sulfate (IRON PO), Take 1 tablet by mouth daily, Disp: , Rfl:     metoprolol succinate ER (TOPROL XL) 25 MG 24 hr tablet, Take 1 tablet (25 mg) by mouth daily, Disp: 90 tablet, Rfl: 3    MULTIVITAMIN (MULTIPLE VITAMINS ORAL), [MULTIVITAMIN (MULTIPLE VITAMINS ORAL)] Take 1 tablet by mouth daily., Disp: , Rfl:     nitroGLYcerin (NITROSTAT) 0.4 MG sublingual tablet, For chest pain place 1 tablet under the tongue every 5 minutes for 3 doses. If symptoms persist 5 minutes after 1st dose call 911., Disp: 25 tablet, Rfl: 4    pramipexole (MIRAPEX) 0.75 MG tablet, [PRAMIPEXOLE (MIRAPEX) 0.75 MG TABLET] Take 1/2 tablet daily, Disp: , Rfl:     propafenone (RYTHMOL) 150 MG TABS tablet, Take 1 tablet (150 mg) by mouth as needed (for a-fib episode lasting longer than 60 minutes), Disp: 10 tablet, Rfl: 11    UNABLE TO FIND, Take 8 drops by mouth daily MEDICATION NAME: Magnesium drops in an 8 oz glass of water, Disp: , Rfl:     VIT C/VIT E/LUTEIN/MIN/OMEGA-3 (OCUVITE ORAL), [VIT C/VIT E/LUTEIN/MIN/OMEGA-3 (OCUVITE ORAL)] Take 1 tablet by mouth daily., Disp: , Rfl:      Allergies:   Patient has no known allergies.    Objective:      Physical Exam  80.3 kg (177 lb)     Body mass index is 23.35 kg/m .  /64 (BP Location: Right arm, Patient Position: Sitting, Cuff Size: Adult Regular)   Pulse 73   Resp 14   Wt 80.3 kg (177 lb)   SpO2 96%   BMI 23.35 kg/m      General Appearance:   Awake, Alert, No acute distress.   HEENT:  Pupil equal, reactive to light. No scleral icterus; the mucous membranes were moist. No oral ulcers or thrush.    Neck: No  cervical bruits. No JVD. No thyromegaly. No lymph node enlargement or tenderness.   Chest: The spine was straight. The chest was symmetric.   Lungs:   Respirations unlabored. Lungs are clear to auscultation. No crackles. No wheezing.   Cardiovascular:   RRR, normal first and second heart sounds with no murmurs. No rubs or gallops.    Abdomen:  Soft. No tenderness. Non-distended. Bowels sounds are present   Extremities: Equal posterior tibial pulses. No leg edema.   Skin: No rashes or ulcers. Warm, Dry.   Musculoskeletal: No tenderness. No deformity.   Neurologic: Mood and affect are appropriate. No focal deficits.         EKG:  Personally reivewed  Sinus rhythm with 1st degree A-V block   Otherwise normal ECG   When compared with ECG of 05-JAN-2004 11:23,   LA interval has increased     Cardiac Imaging Studies  Exercise stress echo October 19, 2022:  Stress findings:  1. Normal stress echocardiogram with no evidence of stress-induced ischemia.  Estimated post exercise left ventricular ejection fraction is greater than  70%.  2. Abnormal stress EKG with development of 2 mm horizontal ST depressions in  leads V4-V6, II, III, and aVF returning to baseline 6:00 minutes into the  recovery period.  3. Exercise capacity is above average for age and gender. The patient  exercised for 10:00 minutes on the Luis Manuel protocol, achieving 11.7 METs and 89%  the age-predicted maximium heart rate. The patient experienced non-limiting  chest pain during exercise.     Rest findings:  1. Left ventricular size, wall thickness, and systolic function are normal.  The estimated left ventricular ejection fraction is 60-65%.  2. Right ventricular size and systolic function are normal.  3. No hemodynamically significant valvular abnormalities.  4. Borderline dilatation of the aortic root measuring 4.2 cm.    Coronary CT angiogram on December 13, 2022:  Right dominant coronary system.  Left main: Short left main with mild stenosis.  Left  "anterior descending artery: Mild nonobstructive stenosis identified in proximal to midportion.  Left circumflex artery: Mild calcified plaque in proximal first obtuse marginal branch which is large vessel. Mid to distal circumflex is a small sized vessel and there appears to be at least moderate calcified stenosis in this area.  Right coronary artery. Stent in the proximal to mid vessel that appears patent. Mild mainly noncalcified plaque noted in distal right coronary artery.    Lab Review   Lab Results   Component Value Date     10/13/2022    CO2 28 10/13/2022    CO2 27 04/26/2022    BUN 17.9 10/13/2022    BUN 19 04/26/2022     No results found for: \"WBC\", \"HGB\", \"HCT\", \"MCV\", \"PLT\"  Lab Results   Component Value Date    CHOL 129 12/07/2023    TRIG 59 12/07/2023    HDL 61 12/07/2023    LDL 56 12/07/2023     LDL Cholesterol Calculated   Date Value Ref Range Status   12/07/2023 56 <=100 mg/dL Final               Thank you for allowing me to participate in the care of your patient.      Sincerely,     August Valencia MD     [facility]  cc:   Lusi Manuel Robertson MD  [address] [address]  "

## 2024-10-21 ENCOUNTER — TELEPHONE (OUTPATIENT)
Dept: CARDIOLOGY | Facility: CLINIC | Age: 79
End: 2024-10-21
Payer: MEDICARE

## 2024-10-21 NOTE — TELEPHONE ENCOUNTER
Prior Authorization Approval    Authorization Effective Date: 7/22/2024  Authorization Expiration Date: 10/21/2025  Medication: Digoxin 250MCG tablets    Approved Dose/Quantity:   Reference #:     Insurance Company: Other (see comments)  Expected CoPay:       CoPay Card Available:      Foundation Assistance Needed:    Which Pharmacy is filling the prescription (Not needed for infusion/clinic administered): [facility] [address] [place] [address]  Pharmacy Notified:  yes  Patient Notified:  yes- Pharmacy will contact patient when ready to /ship

## 2024-10-21 NOTE — TELEPHONE ENCOUNTER
Central Prior Authorization Team   Phone: [phone]    PA Initiation    Medication: Digoxin 250MCG tablets    Insurance Company: Other (see comments)  Pharmacy Filling the Rx: [facility] [address] [place], [place] [email or web address]  Filling Pharmacy Phone: [phone]  Filling Pharmacy Fax:    Start Date: 10/21/2024

## 2025-02-08 ENCOUNTER — HEALTH MAINTENANCE LETTER (OUTPATIENT)
Age: 80
End: 2025-02-08

## 2025-04-07 ENCOUNTER — NURSE TRIAGE (OUTPATIENT)
Dept: CARDIOLOGY | Facility: CLINIC | Age: 80
End: 2025-04-07
Payer: MEDICARE

## 2025-04-07 NOTE — TELEPHONE ENCOUNTER
"Spoke with patient, he states he \"feels okay\". Pt states that he has felt he has been in A Fib for the last 8 hours. Pt denies chest pain, SOB, dizziness, racing heart, palpitations or fatigue. Pt reports /65 and Heart rate 60-70bpm.    Pt notes taking his scheduled medications:  Eliquis, Digoxin, metoprolol, clopidogrel and atorvastatin as scheduled.    Pt states he took the propafenone PRN x2- once at 2:30am and once at 6am and has not felt out of A Fib yet (now 10am).    Pt states he is to get on a plane in 5 hours to come home.     Advised patient it would be safest for him to be evaluated at the hospital. Pt stated he will not have time to go to the hospital before his flight-njm     "

## 2025-04-07 NOTE — TELEPHONE ENCOUNTER
In [facility] for about 7 hours, in [place],     Patient calling clinic reporting he went into Afib about 7 hours ago. Patient endorsed not feeling well but denied any additional symptoms during the time of call. Patient also stated he is currently in [place].     RN advised patient he should be seen for evaluation due to being out of state but patient refused stating he wants recommendation from cardiology.     Routing to team for review.

## 2025-04-08 NOTE — TELEPHONE ENCOUNTER
Called both patient's cell phone and pt's wife, Arvind's cell phone with no answer and unable to leave voicemail.-matt  ______________  ----- Message -----  From: August Valencia MD  Sent: 4/7/2025   4:50 PM CDT  To: Zoe Welsh, RN    Please advise the patient to take 2 tablets of propafenone 1 he had episode of atrial fibrillation.  If his atrial fibrillation become more frequent, please schedule him to see atrial fibrillation clinic.  Thanks  Tyson  ----- Message -----  From: Zoe Welsh RN  Sent: 4/7/2025   3:00 PM CDT  To: August Valencia MD    ----- Message from Zoe Welsh RN sent at 4/7/2025  3:00 PM CDT -----  Please see notes and advise.  Thank you.  -matt

## 2025-04-08 NOTE — TELEPHONE ENCOUNTER
Spoke with patient and updated him of 's response. Pt verbalized understanding, notes that he came out of A Fib yesterday at about 1pm (in [place]). Pt reports now being home and not having any episodes since but has had more lately. Pt transferred to scheduling for EP RAC appt.-[facility]

## 2025-04-21 DIAGNOSIS — I48.0 PAROXYSMAL ATRIAL FIBRILLATION (H): ICD-10-CM

## 2025-04-21 DIAGNOSIS — I25.83 CORONARY ARTERY DISEASE DUE TO LIPID RICH PLAQUE: ICD-10-CM

## 2025-04-21 DIAGNOSIS — I25.10 CORONARY ARTERY DISEASE DUE TO LIPID RICH PLAQUE: ICD-10-CM

## 2025-04-21 RX ORDER — DIGOXIN 250 MCG
250 TABLET ORAL DAILY
Qty: 90 TABLET | Refills: 0 | Status: SHIPPED | OUTPATIENT
Start: 2025-04-21 | End: 2025-04-24

## 2025-04-21 RX ORDER — METOPROLOL SUCCINATE 25 MG/1
25 TABLET, EXTENDED RELEASE ORAL DAILY
Qty: 90 TABLET | Refills: 1 | Status: SHIPPED | OUTPATIENT
Start: 2025-04-21 | End: 2025-04-24

## 2025-04-23 ENCOUNTER — MYC MEDICAL ADVICE (OUTPATIENT)
Dept: CARDIOLOGY | Facility: CLINIC | Age: 80
End: 2025-04-23
Payer: MEDICARE

## 2025-04-23 NOTE — TELEPHONE ENCOUNTER
PC to patient to discuss afib. Patient has appt sched with Honorio Lai 6/13 and he is interested in an earlier appt. Call transferred to  and pt sched with Dr Valencia 4/24.   -sea

## 2025-04-24 ENCOUNTER — OFFICE VISIT (OUTPATIENT)
Dept: CARDIOLOGY | Facility: CLINIC | Age: 80
End: 2025-04-24
Payer: MEDICARE

## 2025-04-24 VITALS
RESPIRATION RATE: 16 BRPM | BODY MASS INDEX: 23.05 KG/M2 | SYSTOLIC BLOOD PRESSURE: 150 MMHG | DIASTOLIC BLOOD PRESSURE: 70 MMHG | HEART RATE: 80 BPM | WEIGHT: 174.7 LBS

## 2025-04-24 DIAGNOSIS — E78.5 DYSLIPIDEMIA, GOAL LDL BELOW 70: Chronic | ICD-10-CM

## 2025-04-24 DIAGNOSIS — I25.10 CORONARY ARTERY DISEASE INVOLVING NATIVE CORONARY ARTERY OF NATIVE HEART WITHOUT ANGINA PECTORIS: ICD-10-CM

## 2025-04-24 DIAGNOSIS — I48.0 PAROXYSMAL ATRIAL FIBRILLATION (H): Primary | ICD-10-CM

## 2025-04-24 RX ORDER — DILTIAZEM HYDROCHLORIDE 180 MG/1
180 CAPSULE, EXTENDED RELEASE ORAL DAILY
Qty: 90 CAPSULE | Refills: 3 | Status: SHIPPED | OUTPATIENT
Start: 2025-04-24

## 2025-04-24 RX ORDER — SOTALOL HYDROCHLORIDE 80 MG/1
80 TABLET ORAL 2 TIMES DAILY
Qty: 180 TABLET | Refills: 3 | Status: SHIPPED | OUTPATIENT
Start: 2025-04-24

## 2025-04-24 NOTE — LETTER
4/24/2025    Luis Manuel Robertson [facility]  [address]  [facility] [address]    RE: Ramesh Pate       Dear Colleague,     I had the pleasure of seeing Ramesh Pate in the [facility].  Pt was recently ill.  Pt is fine now. Thanks    SATYA Varma        Click to link to [facility] HEART CARE NOTE       Assessment/Plan:   Coronary artery disease status post stent placement taking CAD: The patient had no chest pain or shortness of breath.  His coronary CT angiogram in December 2022 was reported to patent the stent, no obstructive lesion.  Continue Plavix, atorvastatin.  Paroxysmal atrial fibrillation: He had more episode of atrial fibrillation recently, longest episode lasted 10 to 12 hours.  We discussed the management of paroxysmal atrial fibrillation.  The patient has no interest in ablation of atrial fibrillation.  Discussed medical treatment.  After discussion, discontinue digoxin 0.25 mg daily, metoprolol XL 25 mg daily.  Started diltiazem  mg daily, sotalol 80 mg twice a day.  He is recently ECG QTc 380 ms.  Repeat ECG next Monday.  Continue to monitor atrial fibrillation. His [id number] score is 3 due to CAD, age.  Continue Eliquis 5 mg twice a day.    Dyslipidemia: Continue atorvastatin 40 mg at at bedtime.  LDL was well-controlled.  Mild right iliac artery dilation: Stable.  Continue to follow-up.    Total 56 minutes were spent in this visit for face to face visit, physical exam, review of current labs/imaging studies, plan for ongoing treatment with >50% spent on counseling and coordination of care as documented in the above mentioned note.    Thank you for the opportunity to be involved in the care of Ramesh Pate. If you have any questions, please feel free to contact me.  I will see the patient again on July 31, 2025    Much or all of the text in this note was generated through the use of Dragon Dictate voice-to-text software. Errors in spelling or  words which seem out of context are unintentional.   Sound alike errors, in particular, may have escaped editing.       History of Present Illness:   It is my pleasure to see Ramesh Pate at the [facility] clinic for evaluation of Follow Up. Ramesh Pate is a 80 year old male with a medical history of coronary artery disease status post stent placement in RCA, paroxysmal atrial fibrillation, dyslipidemia, Mild right iliac artery dilation, gastric reflux disease.    The patient was admitted to local hospital few days ago due to abdominal pain.  He states that he had upper abdominal pain at the beginning and then going down to mid abdomen.  He was rule out acute myocardial infarction in the hospital.  He states that he developed the more frequent episodes of atrial fibrillation recently.  He had several episodes, longest episode lasted 10 to 12 hours.  He denies chest pain, shortness of breath on exertion, dizziness, fatigue.  He had no orthopnea, PND or leg edema.  His blood pressure is a little bit high recently.      Past Medical History:     Patient Active Problem List   Diagnosis     Dyslipidemia, goal LDL below 70     Esophageal reflux     Paroxysmal Atrial Fibrillation     Nonocclusive coronary atherosclerosis of native coronary artery     Precordial pain       Past Surgical History:     Past Surgical History:   Procedure Laterality Date     APPENDECTOMY  1966     REVISE SECONDARY VARICOSITY      Description: Varicose Vein Ligation;  Recorded: 03/07/2013;       Family History:     Family History   Problem Relation Age of Onset     Liver Cancer Mother      Lung Cancer Father      No Known Problems Sister      Parkinsonism Sister      No Known Problems Daughter      No Known Problems Daughter      No Known Problems Daughter        Social History:    reports that he has never smoked. He has never used smokeless tobacco. He reports that he does not drink alcohol and does not use  drugs.    Review of Systems:   12 systems are reviewed negative except for in HPI.    Meds:     Current Outpatient Medications:      apixaban ANTICOAGULANT (ELIQUIS ANTICOAGULANT) 5 MG tablet, Take 1 tablet (5 mg) by mouth 2 times daily, Disp: 180 tablet, Rfl: 3     atorvastatin (LIPITOR) 40 MG tablet, Take 1 tablet (40 mg) by mouth daily, Disp: 90 tablet, Rfl: 3     clopidogrel (PLAVIX) 75 MG tablet, Take 1 tablet (75 mg) by mouth daily, Disp: 90 tablet, Rfl: 3     diltiazem ER (DILT-XR) 180 MG 24 hr capsule, Take 1 capsule (180 mg) by mouth daily., Disp: 90 capsule, Rfl: 3     esomeprazole (NEXIUM) 40 MG DR capsule, Take 40 mg by mouth daily, Disp: , Rfl:      MULTIVITAMIN (MULTIPLE VITAMINS ORAL), [MULTIVITAMIN (MULTIPLE VITAMINS ORAL)] Take 1 tablet by mouth daily., Disp: , Rfl:      nitroGLYcerin (NITROSTAT) 0.4 MG sublingual tablet, For chest pain place 1 tablet under the tongue every 5 minutes for 3 doses. If symptoms persist 5 minutes after 1st dose call 911., Disp: 25 tablet, Rfl: 2     pramipexole (MIRAPEX) 0.75 MG tablet, [PRAMIPEXOLE (MIRAPEX) 0.75 MG TABLET] Take 1/2 tablet daily, Disp: , Rfl:      sotalol (BETAPACE) 80 MG tablet, Take 1 tablet (80 mg) by mouth 2 times daily., Disp: 180 tablet, Rfl: 3     UNABLE TO FIND, Take 8 drops by mouth daily MEDICATION NAME: Magnesium drops in an 8 oz glass of water, Disp: , Rfl:      VIT C/VIT E/LUTEIN/MIN/OMEGA-3 (OCUVITE ORAL), [VIT C/VIT E/LUTEIN/MIN/OMEGA-3 (OCUVITE ORAL)] Take 1 tablet by mouth daily., Disp: , Rfl:      Allergies:   Patient has no known allergies.    Objective:      Physical Exam  79.2 kg (174 lb 11.2 oz)     Body mass index is 23.05 kg/m .  BP (!) 150/70 (BP Location: Left arm, Patient Position: Sitting, Cuff Size: Adult Regular)   Pulse 80   Resp 16   Wt 79.2 kg (174 lb 11.2 oz)   BMI 23.05 kg/m      General Appearance:   Awake, Alert, No acute distress.   HEENT:  Pupil equal, reactive to light. No scleral icterus; the mucous  membranes were moist. No oral ulcers or thrush.    Neck: No cervical bruits. No JVD. No thyromegaly. No lymph node enlargement or tenderness.   Chest: The spine was straight. The chest was symmetric.   Lungs:   Respirations unlabored. Lungs are clear to auscultation. No crackles. No wheezing.   Cardiovascular:   RRR, normal first and second heart sounds with no murmurs. No rubs or gallops.    Abdomen:  Soft. No tenderness. Non-distended. Bowels sounds are present   Extremities: Equal posterior tibial pulses. No leg edema.   Skin: No rashes or ulcers. Warm, Dry.   Musculoskeletal: No tenderness. No deformity.   Neurologic: Mood and affect are appropriate. No focal deficits.         EKG:  Personally reivewed  Sinus rhythm with 1st degree A-V block   Otherwise normal ECG   When compared with ECG of 05-JAN-2004 11:23,   NV interval has increased     Cardiac Imaging Studies  Exercise stress echo October 19, 2022:  Stress findings:  1. Normal stress echocardiogram with no evidence of stress-induced ischemia.  Estimated post exercise left ventricular ejection fraction is greater than  70%.  2. Abnormal stress EKG with development of 2 mm horizontal ST depressions in  leads V4-V6, II, III, and aVF returning to baseline 6:00 minutes into the  recovery period.  3. Exercise capacity is above average for age and gender. The patient  exercised for 10:00 minutes on the Luis Manuel protocol, achieving 11.7 METs and 89%  the age-predicted maximium heart rate. The patient experienced non-limiting  chest pain during exercise.     Rest findings:  1. Left ventricular size, wall thickness, and systolic function are normal.  The estimated left ventricular ejection fraction is 60-65%.  2. Right ventricular size and systolic function are normal.  3. No hemodynamically significant valvular abnormalities.  4. Borderline dilatation of the aortic root measuring 4.2 cm.    Coronary CT angiogram on December 13, 2022:  Right dominant coronary  "system.  Left main: Short left main with mild stenosis.  Left anterior descending artery: Mild nonobstructive stenosis identified in proximal to midportion.  Left circumflex artery: Mild calcified plaque in proximal first obtuse marginal branch which is large vessel. Mid to distal circumflex is a small sized vessel and there appears to be at least moderate calcified stenosis in this area.  Right coronary artery. Stent in the proximal to mid vessel that appears patent. Mild mainly noncalcified plaque noted in distal right coronary artery.    Lab Review   Lab Results   Component Value Date     10/13/2022    CO2 28 10/13/2022    CO2 27 04/26/2022    BUN 17.9 10/13/2022    BUN 19 04/26/2022     No results found for: \"WBC\", \"HGB\", \"HCT\", \"MCV\", \"PLT\"  Lab Results   Component Value Date    CHOL 129 12/07/2023    TRIG 59 12/07/2023    HDL 61 12/07/2023    LDL 56 12/07/2023     LDL Cholesterol Calculated   Date Value Ref Range Status   12/07/2023 56 <=100 mg/dL Final                   Thank you for allowing me to participate in the care of your patient.      Sincerely,     August Valencia MD     [facility]  cc:   August Valencia MD  [address] [facility] [address]  [place] [address]      "

## 2025-04-24 NOTE — PATIENT INSTRUCTIONS
Ramesh Pate,    It is my pleasure to see you today at the [facility].    My recommendations for this visit are:    Discontinue digoxin  Discontinue Metoprolol XL  Discontinue propafenone  Start Sotalol 80 mg twice a day  Start Diltiazem 180 mg daily  Continue Eliquis and Plavix.  Continue atorvastatin  Perform ECG next Monday and send the ECG to me  If you have an episode of atrial fibrillation, taking additional sotalol 40 mg and wait for 1 hour, and then consider additional sotalol 40 mg if atrial fib is converted to sinus rhythm,   Monitor your pulses and blood pressure in next 10 days and then send the numbers to me for reviewing, and modify your medications as needed.  Keep your appointment on 7-31.      August Valencia MD, PhD

## 2025-04-24 NOTE — PROGRESS NOTES
Click to link to [facility] HEART CARE NOTE       Assessment/Plan:   Coronary artery disease status post stent placement taking CAD: The patient had no chest pain or shortness of breath.  His coronary CT angiogram in December 2022 was reported to patent the stent, no obstructive lesion.  Continue Plavix, atorvastatin.  Paroxysmal atrial fibrillation: He had more episode of atrial fibrillation recently, longest episode lasted 10 to 12 hours.  We discussed the management of paroxysmal atrial fibrillation.  The patient has no interest in ablation of atrial fibrillation.  Discussed medical treatment.  After discussion, discontinue digoxin 0.25 mg daily, metoprolol XL 25 mg daily.  Started diltiazem  mg daily, sotalol 80 mg twice a day.  He is recently ECG QTc 380 ms.  Repeat ECG next Monday.  Continue to monitor atrial fibrillation. His [id number] score is 3 due to CAD, age.  Continue Eliquis 5 mg twice a day.    Dyslipidemia: Continue atorvastatin 40 mg at at bedtime.  LDL was well-controlled.  Mild right iliac artery dilation: Stable.  Continue to follow-up.    Total 56 minutes were spent in this visit for face to face visit, physical exam, review of current labs/imaging studies, plan for ongoing treatment with >50% spent on counseling and coordination of care as documented in the above mentioned note.    Thank you for the opportunity to be involved in the care of Ramesh Pate. If you have any questions, please feel free to contact me.  I will see the patient again on July 31, 2025    Much or all of the text in this note was generated through the use of Dragon Dictate voice-to-text software. Errors in spelling or words which seem out of context are unintentional.   Sound alike errors, in particular, may have escaped editing.       History of Present Illness:   It is my pleasure to see Ramesh Pate at the [facility] clinic for evaluation of Follow Up.  Ramesh Pate is a 80 year old male with a medical history of coronary artery disease status post stent placement in RCA, paroxysmal atrial fibrillation, dyslipidemia, Mild right iliac artery dilation, gastric reflux disease.    The patient was admitted to local hospital few days ago due to abdominal pain.  He states that he had upper abdominal pain at the beginning and then going down to mid abdomen.  He was rule out acute myocardial infarction in the hospital.  He states that he developed the more frequent episodes of atrial fibrillation recently.  He had several episodes, longest episode lasted 10 to 12 hours.  He denies chest pain, shortness of breath on exertion, dizziness, fatigue.  He had no orthopnea, PND or leg edema.  His blood pressure is a little bit high recently.      Past Medical History:     Patient Active Problem List   Diagnosis    Dyslipidemia, goal LDL below 70    Esophageal reflux    Paroxysmal Atrial Fibrillation    Nonocclusive coronary atherosclerosis of native coronary artery    Precordial pain       Past Surgical History:     Past Surgical History:   Procedure Laterality Date    APPENDECTOMY  1966    REVISE SECONDARY VARICOSITY      Description: Varicose Vein Ligation;  Recorded: 03/07/2013;       Family History:     Family History   Problem Relation Age of Onset    Liver Cancer Mother     Lung Cancer Father     No Known Problems Sister     Parkinsonism Sister     No Known Problems Daughter     No Known Problems Daughter     No Known Problems Daughter        Social History:    reports that he has never smoked. He has never used smokeless tobacco. He reports that he does not drink alcohol and does not use drugs.    Review of Systems:   12 systems are reviewed negative except for in HPI.    Meds:     Current Outpatient Medications:     apixaban ANTICOAGULANT (ELIQUIS ANTICOAGULANT) 5 MG tablet, Take 1 tablet (5 mg) by mouth 2 times daily, Disp: 180 tablet, Rfl: 3    atorvastatin (LIPITOR)  40 MG tablet, Take 1 tablet (40 mg) by mouth daily, Disp: 90 tablet, Rfl: 3    clopidogrel (PLAVIX) 75 MG tablet, Take 1 tablet (75 mg) by mouth daily, Disp: 90 tablet, Rfl: 3    diltiazem ER (DILT-XR) 180 MG 24 hr capsule, Take 1 capsule (180 mg) by mouth daily., Disp: 90 capsule, Rfl: 3    esomeprazole (NEXIUM) 40 MG DR capsule, Take 40 mg by mouth daily, Disp: , Rfl:     MULTIVITAMIN (MULTIPLE VITAMINS ORAL), [MULTIVITAMIN (MULTIPLE VITAMINS ORAL)] Take 1 tablet by mouth daily., Disp: , Rfl:     nitroGLYcerin (NITROSTAT) 0.4 MG sublingual tablet, For chest pain place 1 tablet under the tongue every 5 minutes for 3 doses. If symptoms persist 5 minutes after 1st dose call 911., Disp: 25 tablet, Rfl: 2    pramipexole (MIRAPEX) 0.75 MG tablet, [PRAMIPEXOLE (MIRAPEX) 0.75 MG TABLET] Take 1/2 tablet daily, Disp: , Rfl:     sotalol (BETAPACE) 80 MG tablet, Take 1 tablet (80 mg) by mouth 2 times daily., Disp: 180 tablet, Rfl: 3    UNABLE TO FIND, Take 8 drops by mouth daily MEDICATION NAME: Magnesium drops in an 8 oz glass of water, Disp: , Rfl:     VIT C/VIT E/LUTEIN/MIN/OMEGA-3 (OCUVITE ORAL), [VIT C/VIT E/LUTEIN/MIN/OMEGA-3 (OCUVITE ORAL)] Take 1 tablet by mouth daily., Disp: , Rfl:      Allergies:   Patient has no known allergies.    Objective:      Physical Exam  79.2 kg (174 lb 11.2 oz)     Body mass index is 23.05 kg/m .  BP (!) 150/70 (BP Location: Left arm, Patient Position: Sitting, Cuff Size: Adult Regular)   Pulse 80   Resp 16   Wt 79.2 kg (174 lb 11.2 oz)   BMI 23.05 kg/m      General Appearance:   Awake, Alert, No acute distress.   HEENT:  Pupil equal, reactive to light. No scleral icterus; the mucous membranes were moist. No oral ulcers or thrush.    Neck: No cervical bruits. No JVD. No thyromegaly. No lymph node enlargement or tenderness.   Chest: The spine was straight. The chest was symmetric.   Lungs:   Respirations unlabored. Lungs are clear to auscultation. No crackles. No wheezing.    Cardiovascular:   RRR, normal first and second heart sounds with no murmurs. No rubs or gallops.    Abdomen:  Soft. No tenderness. Non-distended. Bowels sounds are present   Extremities: Equal posterior tibial pulses. No leg edema.   Skin: No rashes or ulcers. Warm, Dry.   Musculoskeletal: No tenderness. No deformity.   Neurologic: Mood and affect are appropriate. No focal deficits.         EKG:  Personally reivewed  Sinus rhythm with 1st degree A-V block   Otherwise normal ECG   When compared with ECG of 05-JAN-2004 11:23,   CO interval has increased     Cardiac Imaging Studies  Exercise stress echo October 19, 2022:  Stress findings:  1. Normal stress echocardiogram with no evidence of stress-induced ischemia.  Estimated post exercise left ventricular ejection fraction is greater than  70%.  2. Abnormal stress EKG with development of 2 mm horizontal ST depressions in  leads V4-V6, II, III, and aVF returning to baseline 6:00 minutes into the  recovery period.  3. Exercise capacity is above average for age and gender. The patient  exercised for 10:00 minutes on the Luis Manuel protocol, achieving 11.7 METs and 89%  the age-predicted maximium heart rate. The patient experienced non-limiting  chest pain during exercise.     Rest findings:  1. Left ventricular size, wall thickness, and systolic function are normal.  The estimated left ventricular ejection fraction is 60-65%.  2. Right ventricular size and systolic function are normal.  3. No hemodynamically significant valvular abnormalities.  4. Borderline dilatation of the aortic root measuring 4.2 cm.    Coronary CT angiogram on December 13, 2022:  Right dominant coronary system.  Left main: Short left main with mild stenosis.  Left anterior descending artery: Mild nonobstructive stenosis identified in proximal to midportion.  Left circumflex artery: Mild calcified plaque in proximal first obtuse marginal branch which is large vessel. Mid to distal circumflex is a small  "sized vessel and there appears to be at least moderate calcified stenosis in this area.  Right coronary artery. Stent in the proximal to mid vessel that appears patent. Mild mainly noncalcified plaque noted in distal right coronary artery.    Lab Review   Lab Results   Component Value Date     10/13/2022    CO2 28 10/13/2022    CO2 27 04/26/2022    BUN 17.9 10/13/2022    BUN 19 04/26/2022     No results found for: \"WBC\", \"HGB\", \"HCT\", \"MCV\", \"PLT\"  Lab Results   Component Value Date    CHOL 129 12/07/2023    TRIG 59 12/07/2023    HDL 61 12/07/2023    LDL 56 12/07/2023     LDL Cholesterol Calculated   Date Value Ref Range Status   12/07/2023 56 <=100 mg/dL Final               "

## 2025-04-28 ENCOUNTER — TRANSFERRED RECORDS (OUTPATIENT)
Dept: HEALTH INFORMATION MANAGEMENT | Facility: CLINIC | Age: 80
End: 2025-04-28

## 2025-05-27 DIAGNOSIS — I48.0 PAROXYSMAL ATRIAL FIBRILLATION (H): ICD-10-CM

## 2025-06-13 DIAGNOSIS — I25.10 CORONARY ARTERY DISEASE DUE TO LIPID RICH PLAQUE: ICD-10-CM

## 2025-06-13 DIAGNOSIS — I25.83 CORONARY ARTERY DISEASE DUE TO LIPID RICH PLAQUE: ICD-10-CM

## 2025-06-16 RX ORDER — ATORVASTATIN CALCIUM 40 MG/1
40 TABLET, FILM COATED ORAL DAILY
Qty: 90 TABLET | Refills: 3 | Status: SHIPPED | OUTPATIENT
Start: 2025-06-16

## 2025-07-31 ENCOUNTER — OFFICE VISIT (OUTPATIENT)
Dept: CARDIOLOGY | Facility: CLINIC | Age: 80
End: 2025-07-31
Payer: MEDICARE

## 2025-07-31 VITALS
WEIGHT: 177 LBS | HEART RATE: 56 BPM | RESPIRATION RATE: 16 BRPM | SYSTOLIC BLOOD PRESSURE: 128 MMHG | DIASTOLIC BLOOD PRESSURE: 50 MMHG | BODY MASS INDEX: 23.35 KG/M2

## 2025-07-31 DIAGNOSIS — I73.9 PERIPHERAL ARTERIAL DISEASE: ICD-10-CM

## 2025-07-31 DIAGNOSIS — I25.10 CORONARY ARTERY DISEASE INVOLVING NATIVE CORONARY ARTERY OF NATIVE HEART WITHOUT ANGINA PECTORIS: ICD-10-CM

## 2025-07-31 DIAGNOSIS — I48.0 PAROXYSMAL ATRIAL FIBRILLATION (H): Primary | ICD-10-CM

## 2025-07-31 DIAGNOSIS — E78.5 DYSLIPIDEMIA, GOAL LDL BELOW 70: Chronic | ICD-10-CM

## 2025-07-31 RX ORDER — CEPHALEXIN 500 MG/1
500 CAPSULE ORAL 3 TIMES DAILY
COMMUNITY
Start: 2025-07-25 | End: 2025-08-04

## 2025-07-31 RX ORDER — ASPIRIN 81 MG/1
81 TABLET ORAL DAILY
Qty: 90 TABLET | Refills: 3 | Status: SHIPPED | OUTPATIENT
Start: 2025-07-31

## 2025-07-31 RX ORDER — PRAMIPEXOLE DIHYDROCHLORIDE 1 MG/1
0.5 TABLET ORAL DAILY
COMMUNITY
Start: 2025-07-23

## 2025-07-31 NOTE — LETTER
7/31/2025    Luis Manuel Robertson MD  [address] [address]    RE: Ramesh Pate       Dear Colleague,     I had the pleasure of seeing Ramesh Pate in the [facility].      Click to link to [facility] HEART CARE NOTE       Assessment/Plan:   Coronary artery disease status post stent placement taking CAD: The patient had no chest pain or shortness of breath.  His coronary CT angiogram in December 2022 was reported to patent the stent, no obstructive lesion.  Discontinue Plavix, start aspirin 81 mg daily, continue atorvastatin.  Paroxysmal atrial fibrillation: His atrial fibrillation has been well-controlled with sotalol 80 mg twice a day, diltiazem 120 mg daily.  Since he stopped these 2 medications, he did not have a palpitations over the last 3 months.  He feels fine.  No palpitations.  QTc was 381 after he started the sotalol.  Continue to monitor atrial fibrillation. His [id number] score is 3 due to CAD, age.  Continue Eliquis 5 mg twice a day.    Dyslipidemia: Continue atorvastatin 40 mg at at bedtime.  LDL was well-controlled.  Mild right iliac artery dilation: Stable.  Continue to follow-up.        Thank you for the opportunity to be involved in the care of Ramesh Pate. If you have any questions, please feel free to contact me.  I will see the patient again in 12 months and as needed.    Much or all of the text in this note was generated through the use of Dragon Dictate voice-to-text software. Errors in spelling or words which seem out of context are unintentional.   Sound alike errors, in particular, may have escaped editing.       History of Present Illness:   It is my pleasure to see Ramesh Pate at the [facility] for routine cardiology follow-up. Ramesh Pate is a 80 year old male with a medical history of coronary artery disease status post stent placement in RCA, paroxysmal atrial fibrillation,  dyslipidemia, Mild right iliac artery dilation, gastric reflux disease.    The patient states that he has been doing much better after he started sotalol 80 mg twice a day and the diltiazem CD1 120 mg daily.  He did not feel palpitations since then.  He denies chest pain, shortness of breath on exertion, dizziness, lightheadedness, orthopnea, PND or leg edema.  His blood pressure and heart rate are well-controlled.  LDL are well-controlled.  No side effects from current medications.      Past Medical History:     Patient Active Problem List   Diagnosis     Dyslipidemia, goal LDL below 70     Esophageal reflux     Paroxysmal Atrial Fibrillation     Coronary artery disease involving native coronary artery of native heart without angina pectoris     Precordial pain       Past Surgical History:     Past Surgical History:   Procedure Laterality Date     APPENDECTOMY  1966     REVISE SECONDARY VARICOSITY      Description: Varicose Vein Ligation;  Recorded: 03/07/2013;       Family History:     Family History   Problem Relation Age of Onset     Liver Cancer Mother      Lung Cancer Father      No Known Problems Sister      Parkinsonism Sister      No Known Problems Daughter      No Known Problems Daughter      No Known Problems Daughter        Social History:    reports that he has never smoked. He has never used smokeless tobacco. He reports that he does not drink alcohol and does not use drugs.    Review of Systems:   12 systems are reviewed negative except for in HPI.    Meds:     Current Outpatient Medications:      apixaban ANTICOAGULANT (ELIQUIS ANTICOAGULANT) 5 MG tablet, Take 1 tablet (5 mg) by mouth 2 times daily., Disp: 180 tablet, Rfl: 3     atorvastatin (LIPITOR) 40 MG tablet, Take 1 tablet by mouth once daily, Disp: 90 tablet, Rfl: 3     cephALEXin (KEFLEX) 500 MG capsule, Take 500 mg by mouth 3 times daily., Disp: , Rfl:      clopidogrel (PLAVIX) 75 MG tablet, Take 1 tablet (75 mg) by mouth daily., Disp: 90  tablet, Rfl: 1     diltiazem ER (DILT-XR) 120 MG 24 hr capsule, Take 1 capsule (120 mg) by mouth daily., Disp: 90 capsule, Rfl: 3     esomeprazole (NEXIUM) 40 MG DR capsule, Take 40 mg by mouth daily, Disp: , Rfl:      MULTIVITAMIN (MULTIPLE VITAMINS ORAL), [MULTIVITAMIN (MULTIPLE VITAMINS ORAL)] Take 1 tablet by mouth daily., Disp: , Rfl:      nitroGLYcerin (NITROSTAT) 0.4 MG sublingual tablet, For chest pain place 1 tablet under the tongue every 5 minutes for 3 doses. If symptoms persist 5 minutes after 1st dose call 911., Disp: 25 tablet, Rfl: 2     pramipexole (MIRAPEX) 1 MG tablet, Take 0.5 mg by mouth daily., Disp: , Rfl:      sotalol (BETAPACE) 80 MG tablet, Take 1 tablet (80 mg) by mouth 2 times daily., Disp: 180 tablet, Rfl: 3     UNABLE TO FIND, Take 8 drops by mouth daily MEDICATION NAME: Magnesium drops in an 8 oz glass of water, Disp: , Rfl:      VIT C/VIT E/LUTEIN/MIN/OMEGA-3 (OCUVITE ORAL), [VIT C/VIT E/LUTEIN/MIN/OMEGA-3 (OCUVITE ORAL)] Take 1 tablet by mouth daily., Disp: , Rfl:      Allergies:   Patient has no known allergies.    Objective:      Physical Exam  80.3 kg (177 lb)     Body mass index is 23.35 kg/m .  /50 (BP Location: Right arm, Patient Position: Sitting, Cuff Size: Adult Regular)   Pulse 56   Resp 16   Wt 80.3 kg (177 lb)   BMI 23.35 kg/m      General Appearance:   Awake, Alert, No acute distress.   HEENT:  Pupil equal, reactive to light. No scleral icterus; the mucous membranes were moist. No oral ulcers or thrush.    Neck: No cervical bruits. No JVD. No thyromegaly. No lymph node enlargement or tenderness.   Chest: The spine was straight. The chest was symmetric.   Lungs:   Respirations unlabored. Lungs are clear to auscultation. No crackles. No wheezing.   Cardiovascular:   RRR, normal first and second heart sounds with no murmurs. No rubs or gallops.    Abdomen:  Soft. No tenderness. Non-distended. Bowels sounds are present   Extremities: Equal posterior tibial pulses.  No leg edema.   Skin: No rashes or ulcers. Warm, Dry.   Musculoskeletal: No tenderness. No deformity.   Neurologic: Mood and affect are appropriate. No focal deficits.         EKG:  Personally reivewed  Sinus rhythm with 1st degree A-V block   Otherwise normal ECG   When compared with ECG of 05-JAN-2004 11:23,   MO interval has increased     Cardiac Imaging Studies  Exercise stress echo October 19, 2022:  Stress findings:  1. Normal stress echocardiogram with no evidence of stress-induced ischemia.  Estimated post exercise left ventricular ejection fraction is greater than  70%.  2. Abnormal stress EKG with development of 2 mm horizontal ST depressions in  leads V4-V6, II, III, and aVF returning to baseline 6:00 minutes into the  recovery period.  3. Exercise capacity is above average for age and gender. The patient  exercised for 10:00 minutes on the Luis Manuel protocol, achieving 11.7 METs and 89%  the age-predicted maximium heart rate. The patient experienced non-limiting  chest pain during exercise.     Rest findings:  1. Left ventricular size, wall thickness, and systolic function are normal.  The estimated left ventricular ejection fraction is 60-65%.  2. Right ventricular size and systolic function are normal.  3. No hemodynamically significant valvular abnormalities.  4. Borderline dilatation of the aortic root measuring 4.2 cm.    Coronary CT angiogram on December 13, 2022:  Right dominant coronary system.  Left main: Short left main with mild stenosis.  Left anterior descending artery: Mild nonobstructive stenosis identified in proximal to midportion.  Left circumflex artery: Mild calcified plaque in proximal first obtuse marginal branch which is large vessel. Mid to distal circumflex is a small sized vessel and there appears to be at least moderate calcified stenosis in this area.  Right coronary artery. Stent in the proximal to mid vessel that appears patent. Mild mainly noncalcified plaque noted in distal  "right coronary artery.    Lab Review   Lab Results   Component Value Date     10/13/2022    CO2 28 10/13/2022    CO2 27 04/26/2022    BUN 17.9 10/13/2022    BUN 19 04/26/2022     No results found for: \"WBC\", \"HGB\", \"HCT\", \"MCV\", \"PLT\"  Lab Results   Component Value Date    CHOL 129 12/07/2023    TRIG 59 12/07/2023    HDL 61 12/07/2023    LDL 56 12/07/2023     LDL Cholesterol Calculated   Date Value Ref Range Status   12/07/2023 56 <=100 mg/dL Final                 Thank you for allowing me to participate in the care of your patient.      Sincerely,     August Valencia MD     [facility]  cc:   August Valencia MD  [address]  [place] [address]      "

## 2025-07-31 NOTE — PROGRESS NOTES
Click to link to [facility] HEART CARE NOTE       Assessment/Plan:   Coronary artery disease status post stent placement taking CAD: The patient had no chest pain or shortness of breath.  His coronary CT angiogram in December 2022 was reported to patent the stent, no obstructive lesion.  Discontinue Plavix, start aspirin 81 mg daily, continue atorvastatin.  Paroxysmal atrial fibrillation: His atrial fibrillation has been well-controlled with sotalol 80 mg twice a day, diltiazem 120 mg daily.  Since he stopped these 2 medications, he did not have a palpitations over the last 3 months.  He feels fine.  No palpitations.  QTc was 381 after he started the sotalol.  Continue to monitor atrial fibrillation. His [id number] score is 3 due to CAD, age.  Continue Eliquis 5 mg twice a day.    Dyslipidemia: Continue atorvastatin 40 mg at at bedtime.  LDL was well-controlled.  Mild right iliac artery dilation: Stable.  Continue to follow-up.        Thank you for the opportunity to be involved in the care of Ramesh Pate. If you have any questions, please feel free to contact me.  I will see the patient again in 12 months and as needed.    Much or all of the text in this note was generated through the use of Dragon Dictate voice-to-text software. Errors in spelling or words which seem out of context are unintentional.   Sound alike errors, in particular, may have escaped editing.       History of Present Illness:   It is my pleasure to see Ramesh Pate at the [facility] for routine cardiology follow-up. Ramesh Pate is a 80 year old male with a medical history of coronary artery disease status post stent placement in RCA, paroxysmal atrial fibrillation, dyslipidemia, Mild right iliac artery dilation, gastric reflux disease.    The patient states that he has been doing much better after he started sotalol 80 mg twice a day and the diltiazem CD1 120 mg daily.  He  did not feel palpitations since then.  He denies chest pain, shortness of breath on exertion, dizziness, lightheadedness, orthopnea, PND or leg edema.  His blood pressure and heart rate are well-controlled.  LDL are well-controlled.  No side effects from current medications.      Past Medical History:     Patient Active Problem List   Diagnosis    Dyslipidemia, goal LDL below 70    Esophageal reflux    Paroxysmal Atrial Fibrillation    Coronary artery disease involving native coronary artery of native heart without angina pectoris    Precordial pain       Past Surgical History:     Past Surgical History:   Procedure Laterality Date    APPENDECTOMY  1966    REVISE SECONDARY VARICOSITY      Description: Varicose Vein Ligation;  Recorded: 03/07/2013;       Family History:     Family History   Problem Relation Age of Onset    Liver Cancer Mother     Lung Cancer Father     No Known Problems Sister     Parkinsonism Sister     No Known Problems Daughter     No Known Problems Daughter     No Known Problems Daughter        Social History:    reports that he has never smoked. He has never used smokeless tobacco. He reports that he does not drink alcohol and does not use drugs.    Review of Systems:   12 systems are reviewed negative except for in HPI.    Meds:     Current Outpatient Medications:     apixaban ANTICOAGULANT (ELIQUIS ANTICOAGULANT) 5 MG tablet, Take 1 tablet (5 mg) by mouth 2 times daily., Disp: 180 tablet, Rfl: 3    atorvastatin (LIPITOR) 40 MG tablet, Take 1 tablet by mouth once daily, Disp: 90 tablet, Rfl: 3    cephALEXin (KEFLEX) 500 MG capsule, Take 500 mg by mouth 3 times daily., Disp: , Rfl:     clopidogrel (PLAVIX) 75 MG tablet, Take 1 tablet (75 mg) by mouth daily., Disp: 90 tablet, Rfl: 1    diltiazem ER (DILT-XR) 120 MG 24 hr capsule, Take 1 capsule (120 mg) by mouth daily., Disp: 90 capsule, Rfl: 3    esomeprazole (NEXIUM) 40 MG DR capsule, Take 40 mg by mouth daily, Disp: , Rfl:     MULTIVITAMIN  (MULTIPLE VITAMINS ORAL), [MULTIVITAMIN (MULTIPLE VITAMINS ORAL)] Take 1 tablet by mouth daily., Disp: , Rfl:     nitroGLYcerin (NITROSTAT) 0.4 MG sublingual tablet, For chest pain place 1 tablet under the tongue every 5 minutes for 3 doses. If symptoms persist 5 minutes after 1st dose call 911., Disp: 25 tablet, Rfl: 2    pramipexole (MIRAPEX) 1 MG tablet, Take 0.5 mg by mouth daily., Disp: , Rfl:     sotalol (BETAPACE) 80 MG tablet, Take 1 tablet (80 mg) by mouth 2 times daily., Disp: 180 tablet, Rfl: 3    UNABLE TO FIND, Take 8 drops by mouth daily MEDICATION NAME: Magnesium drops in an 8 oz glass of water, Disp: , Rfl:     VIT C/VIT E/LUTEIN/MIN/OMEGA-3 (OCUVITE ORAL), [VIT C/VIT E/LUTEIN/MIN/OMEGA-3 (OCUVITE ORAL)] Take 1 tablet by mouth daily., Disp: , Rfl:      Allergies:   Patient has no known allergies.    Objective:      Physical Exam  80.3 kg (177 lb)     Body mass index is 23.35 kg/m .  /50 (BP Location: Right arm, Patient Position: Sitting, Cuff Size: Adult Regular)   Pulse 56   Resp 16   Wt 80.3 kg (177 lb)   BMI 23.35 kg/m      General Appearance:   Awake, Alert, No acute distress.   HEENT:  Pupil equal, reactive to light. No scleral icterus; the mucous membranes were moist. No oral ulcers or thrush.    Neck: No cervical bruits. No JVD. No thyromegaly. No lymph node enlargement or tenderness.   Chest: The spine was straight. The chest was symmetric.   Lungs:   Respirations unlabored. Lungs are clear to auscultation. No crackles. No wheezing.   Cardiovascular:   RRR, normal first and second heart sounds with no murmurs. No rubs or gallops.    Abdomen:  Soft. No tenderness. Non-distended. Bowels sounds are present   Extremities: Equal posterior tibial pulses. No leg edema.   Skin: No rashes or ulcers. Warm, Dry.   Musculoskeletal: No tenderness. No deformity.   Neurologic: Mood and affect are appropriate. No focal deficits.         EKG:  Personally reivewed  Sinus rhythm with 1st degree A-V  "block   Otherwise normal ECG   When compared with ECG of 05-JAN-2004 11:23,   MT interval has increased     Cardiac Imaging Studies  Exercise stress echo October 19, 2022:  Stress findings:  1. Normal stress echocardiogram with no evidence of stress-induced ischemia.  Estimated post exercise left ventricular ejection fraction is greater than  70%.  2. Abnormal stress EKG with development of 2 mm horizontal ST depressions in  leads V4-V6, II, III, and aVF returning to baseline 6:00 minutes into the  recovery period.  3. Exercise capacity is above average for age and gender. The patient  exercised for 10:00 minutes on the Luis Manuel protocol, achieving 11.7 METs and 89%  the age-predicted maximium heart rate. The patient experienced non-limiting  chest pain during exercise.     Rest findings:  1. Left ventricular size, wall thickness, and systolic function are normal.  The estimated left ventricular ejection fraction is 60-65%.  2. Right ventricular size and systolic function are normal.  3. No hemodynamically significant valvular abnormalities.  4. Borderline dilatation of the aortic root measuring 4.2 cm.    Coronary CT angiogram on December 13, 2022:  Right dominant coronary system.  Left main: Short left main with mild stenosis.  Left anterior descending artery: Mild nonobstructive stenosis identified in proximal to midportion.  Left circumflex artery: Mild calcified plaque in proximal first obtuse marginal branch which is large vessel. Mid to distal circumflex is a small sized vessel and there appears to be at least moderate calcified stenosis in this area.  Right coronary artery. Stent in the proximal to mid vessel that appears patent. Mild mainly noncalcified plaque noted in distal right coronary artery.    Lab Review   Lab Results   Component Value Date     10/13/2022    CO2 28 10/13/2022    CO2 27 04/26/2022    BUN 17.9 10/13/2022    BUN 19 04/26/2022     No results found for: \"WBC\", \"HGB\", \"HCT\", \"MCV\", " "\"PLT\"  Lab Results   Component Value Date    CHOL 129 12/07/2023    TRIG 59 12/07/2023    HDL 61 12/07/2023    LDL 56 12/07/2023     LDL Cholesterol Calculated   Date Value Ref Range Status   12/07/2023 56 <=100 mg/dL Final               "